# Patient Record
Sex: FEMALE | Race: BLACK OR AFRICAN AMERICAN | Employment: FULL TIME | URBAN - METROPOLITAN AREA
[De-identification: names, ages, dates, MRNs, and addresses within clinical notes are randomized per-mention and may not be internally consistent; named-entity substitution may affect disease eponyms.]

---

## 2017-11-08 ENCOUNTER — HOSPITAL ENCOUNTER (OUTPATIENT)
Dept: FAMILY PLANNING/WOMEN'S HEALTH CLINIC | Age: 42
Discharge: HOME | End: 2017-11-08
Payer: COMMERCIAL

## 2017-11-08 PROCEDURE — 99203 OFFICE O/P NEW LOW 30 MIN: CPT

## 2017-11-08 NOTE — PROGRESS NOTES
[address]  [address]  [address] [place] [address], [address] [facility] [address]  Dept: [phone]    LACTATION REPORT TO HEALTHCARE PROVIDER    Date: 2017    Dear Bucky Dietrich MD: Fanny Johns: This is to inform you that I have seen    Baby name (First Name, Last Name): Jonetta Blizzard                                  Mother: Ether Coop    Reason for Visit: insufficient weight gain    Baby date of birth: [birth year]     Bucky Dietrich Gestational age: 44  Birth Weight (Born Outside of [facility]): 3.118 kg (6 lb 14 oz)     Discharge weight: 2.722 kg (6 lb)     Date                  Weight        Recorded Weight Date 1: 10/27/17  Recorded Weight 1: 3.033 kg (6 lb 11 oz)        Recorded  Weight Date 2: 17  Recorded Weight 2: 3.277 kg (7 lb 3.6 oz) (naked)    Pre-feed Weight: 3.297 kg (7 lb 4.3 oz)     Post-feed Weight Right Breast: 3.314 kg (7 lb 4.9 oz)     Total amount of milk transferred: Total Weight Gain: 0.6 oz    Total time of feedin    Amount of milk pumped (PC): 10 ml    Amount of milk/formula supplemented: 60ML formula    Breast Assessment:  Left Breast: Large  Left Nipple: Intact; Everted  Right Breast: Large  Right Nipple: Intact; Everted    Oral assessement: short frenulum    Enclosed please find a copy of the instructions given to the patient. Mom in to visit today for  Consultation regarding baby Tom's feedings and weight gain. Infant is feeding every 1.5-2 hours for approximately 45 minutes per feeding. Mom is giving infant 2 ounces of formula 3 times per day per pediatricians recommendation. Jennifer Denny observed at breast, deep latch obtained, sucking rhythmically with audible swallowing. Infant noted to have a short posterior frenulum. Able to extend tongue over gumline, but has limited elevation of tongue, cupping noted. Information provided to mom for referral to Estefany Edwards for evaluation of a tight frenulum.     After nursing today during consultation, infant transferred approximately 0.6 ounces of breast milk. Following nursing, mom double pumped using the Spectra pump and obtained 10cc of breastmilk. Infant took 2 ounces of formula following nursing session. Please feel free to contact me at [facility] with any questions or concerns.     Thank you,    Chad Padgett RN [facility]

## 2017-11-08 NOTE — DISCHARGE INSTRUCTIONS
Baby's Name:   Weight / Amount Date/Location     Birth Weight 6 lb 14 oz 10/15/17   [facility]    Discharge Weight 6 lb 0 oz     Weight Check      Last Weight 6 lb 11 oz 10/27/17 Pediatrician    Today's Weight - Pre-feed 7 lb 4.3 oz 11/8/17 [facility]    Today's Weight - Post-feed 7 lb 4.9 oz     Total Amount of Milk Transferred   . 6 ounce          Notes:           Next weight check at [address]  Date Location   Return in 1 week  [place]       Breastfeeding instructions    · Follow up: Following ENT consult                                                               · Eat at least 3 well-balanced meals per day with snacks and drink to thirst.  · Sleep when baby sleeps  · Watch for babys natural feeding cues: e.g. sucking on fist, rooting. Dont allow baby to become too hungry. Skin to skin contact with baby as much as possible during day. · Attempt to feed baby every 2-3 hours for minimum of 8 times per day, maximum 12 times per day. Special Instructions:                              Pump for 10-15 minutes after breastfeeding sessions         · Nurse baby on first breast until he/she no longer swallows, even with breast compression, then offer second breast.    · Feed baby     2   ounce(s) of expressed breast milk / formula of pediatricians choice after breastfeeding(s). ·  If baby doesn't breast feed give        oz at each feeding. · Use wide based nipple/slow flow nipple such as Elisha/TommyTippee/Munchkin Latch/Calma. View video of paced bottle feeding at www.Modular Patterns. InvestGlass  Or  http://Mavent. InvestGlass/paced-bottle-feeding/  · Keep written record of feeding times and babys urine and stool output. · Call pediatrician if baby:  1. Has less than 6 wet / 3-4 stools in 24 hours (newborns over 6 days)  2. Has concentrated, strong smelling urine  3. Is lethargic or not feeding  4. Has forceful spitting  5. Is extremely fussy or colicky and cannot be calmed  6.  Is jaundiced / color is yellow  7. Is running a fever  8. Blood or mucous in the stool  9. Any concerns    INFORMATION ON TONGUE TIE:    Tongue-tie is an anatomical abnormality affecting the ability of the tongue to move in an appropriate way to facilitate proper suckling at the breast. This may affect either the ability to stick the tongue out or to lift the tongue upward or both. Tongue-tie can sometimes be seen when the baby raises the tongue and sometimes can on ly be felt by an experienced medical professional on exam.    [place] April can contribute to nipple pain and damage, ineffective breast emptying, tiring easily at the breast, short sleep cycles, reflux and colic, lip blisters, and poor weight gain. In a majority of cases,  tongue tie is accompanied by lip tie as well which prevents the upper lip from flanging out properly. Almost all tongue-tie and lip-tie can be treated with a simple medical procedure to release the tie. This procedure should be performed by an experienced medical professional. Langley Snellen refers clients suspected of tongue or lip tie to Dr. Gus Abdi at [address] [place] [address], and [facility] located at [address] [place] [address], [address] [place] [address]. [phone]    For additional information on tongue and lip tie and post procedure exercises go to [facility]. Schedule consult with Dr. Tiffany Aquino and return to [address] [place] [address] following ENT consult for evaluation. Start herbal supplements as suggested on handouts to boost milk supply.

## 2017-11-10 ENCOUNTER — HOSPITAL ENCOUNTER (OUTPATIENT)
Dept: FAMILY PLANNING/WOMEN'S HEALTH CLINIC | Age: 42
Discharge: HOME | End: 2017-11-10
Payer: COMMERCIAL

## 2017-11-10 PROCEDURE — 99213 OFFICE O/P EST LOW 20 MIN: CPT

## 2017-11-10 NOTE — DISCHARGE INSTRUCTIONS
Baby's Name: Mother's Name:   Kendell Stallings   Baby's Date of Birth: Date of Procedure:    10/[birth year]/17  11/10/17   Procedure(s) Done: Physician:   linguinal frenotomy; labial frenulectomy  Courtney Alvarado     Notes: Mother reports that baby seemed to latch and tug better yesterday after procedure, but this morning not as strong. Feeding now with wide gape, swallows noted. Reviewed how to position infant with nipple angled toward roof of mouth and lower lip low on areola. Intake 0.9 oz. In 30 minutes Compared with 0.6 ounces in 35 minutes. Encouraged pumping after feedings for increasing supply, and to start More Milk Plus herbal supplement when able. Will schedule follow up visit for pre and post feed weight check after 7 days of taking herbal supplement. Pre-feed Weight: 7 lb 6.1 oz Post-feed Weight: 7 lb 7 oz       Length of Feeding (minutes): 30 minutes Total Intake: 0.9 oz       Next Follow-up:  Location of Follow-up:    to be scheduled in 7 to 10 days  AWP                         . awp

## 2017-11-20 ENCOUNTER — HOSPITAL ENCOUNTER (OUTPATIENT)
Dept: FAMILY PLANNING/WOMEN'S HEALTH CLINIC | Age: 42
Discharge: HOME | End: 2017-11-20
Payer: COMMERCIAL

## 2017-11-20 PROCEDURE — 99211 OFF/OP EST MAY X REQ PHY/QHP: CPT

## 2017-11-20 PROCEDURE — 99212 OFFICE O/P EST SF 10 MIN: CPT

## 2018-07-12 ENCOUNTER — OFFICE VISIT (OUTPATIENT)
Dept: INTERNAL MEDICINE CLINIC | Age: 43
End: 2018-07-12

## 2018-07-12 VITALS
SYSTOLIC BLOOD PRESSURE: 118 MMHG | DIASTOLIC BLOOD PRESSURE: 66 MMHG | WEIGHT: 177 LBS | HEIGHT: 64 IN | RESPIRATION RATE: 12 BRPM | HEART RATE: 83 BPM | TEMPERATURE: 98.4 F | BODY MASS INDEX: 30.22 KG/M2 | OXYGEN SATURATION: 98 %

## 2018-07-12 DIAGNOSIS — J06.9 VIRAL UPPER RESPIRATORY TRACT INFECTION: Primary | ICD-10-CM

## 2018-07-12 DIAGNOSIS — L30.9 HAND DERMATITIS: ICD-10-CM

## 2018-07-12 NOTE — PROGRESS NOTES
HISTORY OF PRESENT ILLNESS  Eve Donis is a 41 y.o. female. HPI  New patient to me, previous patient of R and [facility]. 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 No recurrence. Follows up yearly with ENT oncologist and radiation oncologist.  Missed this years appointment but will schedule. Had 1st child (son) born October 2017. Required C section secondary to fever and tachycardia of baby but otw nml  Pregnancy. IN the last month has started to exercise again and has lost 10 lbs. Prepregnancy weight was 150 but would like to get back to 140s. Runs for exercise. 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 Last week son diagnosed with ear infection. Went back yesterday and had hand foot and mouth. Pt had rash start dorsal aspect of left hand. Also with nasal congestion, rhinorrhea, headache, neck soreness. Low grade temp on Monday. Current Outpatient Prescriptions:     ketotifen (ZADITOR) 0.025 % ophthalmic solution, Administer 1 Drop to both eyes two (2) times a day., Disp: , Rfl:     ACETAMINOPHEN (TYLENOL 8 HOUR PO), Take  by mouth., Disp: , Rfl:     multivitamin (ONE A DAY) tablet, Take 1 Tab by mouth daily. , Disp: , Rfl:     CHOLECALCIFEROL, VITAMIN D3, (VITAMIN D3 PO), Take  by mouth., Disp: , Rfl:     IBUPROFEN (ADVIL PO), Take  by mouth as needed. , Disp: , Rfl:     HYLATOPIC PLUS crea, , Disp: , Rfl: 2    desogestrel-ethinyl estradiol (DESOGEN) 0.15-0.03 mg per tablet, Take 1 Tab by mouth daily. , Disp: 1 Package, Rfl: 11    OTHER, Cepia complex in alcohol, Disp: 30 tablet, Rfl: 0    Visit Vitals    /66 (BP 1 Location: Right arm, BP Patient Position: Sitting)    Pulse 83    Temp 98.4 °F (36.9 °C) (Oral)    Resp 12    Ht 5' 4\" (1.626 m)    Wt 177 lb (80.3 kg)    SpO2 98%    BMI 30.38 kg/m2           ROS  See above  Physical Exam   Constitutional: She appears well-developed and well-nourished. HENT:   Head: Normocephalic and atraumatic. Right Ear: External ear normal.   Left Ear: External ear normal.   Nares - edematous with clear discharge  OP - mild posterior drainage and erythema but no ulcerations or lesions. Sinuses nontender. Neck: Neck supple. Cardiovascular: Normal rate, regular rhythm and normal heart sounds. Exam reveals no gallop and no friction rub. No murmur heard. Pulmonary/Chest: Effort normal and breath sounds normal.   Lymphadenopathy:     She has no cervical adenopathy. Skin:   Left dorsal hand with dryness and scaling. No erythema  Palmar hands and feet without lesions or ulcerations. Vitals reviewed. ASSESSMENT and PLAN  URI - mucinex and saline ns,  Discussed signs and symptoms of hand, foot and mouth dz but no symptoms at this time  Dermatitis left dorsal hand - otc hydrocortisone cream.    Hx mucoepidermoid carcinoma of the hard palate - f/u with ENT oncologist and radiation oncologist  No orders of the defined types were placed in this encounter.     Follow-up Disposition: Not on File

## 2018-07-12 NOTE — PATIENT INSTRUCTIONS
Mucinex (plain not D or DM) 1200mg twice a day  Saline nasal spray - 2 squirts each nostril 2-3 times daily.

## 2018-07-12 NOTE — PROGRESS NOTES
1. Have you been to the ER, urgent care clinic since your last visit? Hospitalized since your last visit? Yes When: 10/2017 Where: [address] doctors Reason for visit: Birth      2. Have you seen or consulted any other health care providers outside of the [address] [place] [address] since your last visit? Include any pap smears or colon screening. No    Chief Complaint   Patient presents with    Establish Care    Rash     Pt states on left hand x1 day. Pt states skin feels dry.  Sinus Infection     Pt states headache, nasal congestion, neck pain x4 days. Pt states treatment with plenty of fluids, tyelnol with some relief.

## 2018-07-12 NOTE — MR AVS SNAPSHOT
[address] [place] [address] [address] [facility] 
[phone] Patient: Alley Shields MRN: [id number] Visit Information Date & Time Provider Department Dept. Phone Encounter #  
 7/12/2018 10:40 AM Enrike Smith MD [facility] [phone] [id number] Upcoming Health Maintenance Date Due Pneumococcal 19-64 Highest Risk (1 of 3 - PCV13) 2/25/1996 PAP AKA CERVICAL CYTOLOGY 12/9/2017 Influenza Age 5 to Adult 8/1/2018 DTaP/Tdap/Td series (2 - Td) 9/22/2025 Allergies as of 7/12/2018  Review Complete On: 7/12/2018 By: Enrike Smith MD  
  
 Severity Noted Reaction Type Reactions Advil [Ibuprofen]  12/09/2014    Rash Drug induced eruption Codeine  11/08/2011    Unknown (comments) Sulfa (Sulfonamide Antibiotics)  11/08/2011    Unknown (comments) Vomiting Current Immunizations  Reviewed on 9/22/2015 Name Date Influenza Vaccine 9/17/2016, 9/27/2014, 10/21/2013 Tdap 9/22/2015 Not reviewed this visit Vitals BP Pulse Temp Resp Height(growth percentile) Weight(growth percentile)  
 118/66 (BP 1 Location: Right arm, BP Patient Position: Sitting) 83 98.4 °F (36.9 °C) (Oral) 12 5' 4\" (1.626 m) 177 lb (80.3 kg) SpO2 BMI OB Status Smoking Status 98% 30.38 kg/m2 Recent pregnancy Never Smoker BMI and BSA Data Body Mass Index Body Surface Area  
 30.38 kg/m 2 1.9 m 2 Preferred Pharmacy Pharmacy Name Phone [facility] [address]  [facility] [phone] Your Updated Medication List  
  
   
This list is accurate as of 7/12/18 11:33 AM.  Always use your most recent med list. ADVIL PO Take  by mouth as needed. multivitamin tablet Commonly known as:  ONE A DAY Take 1 Tab by mouth daily.   
  
 TYLENOL 8 HOUR PO  
 Take  by mouth. VITAMIN D3 PO Take  by mouth. ZADITOR 0.025 % (0.035 %) ophthalmic solution Generic drug:  ketotifen Administer 1 Drop to both eyes two (2) times a day. Patient Instructions Mucinex (plain not D or DM) 1200mg twice a day Saline nasal spray - 2 squirts each nostril 2-3 times daily. Introducing [facility] SERVICES! Dear Silvano Smoker: 
Thank you for requesting a [email or web address] account. Our records indicate that you already have an active [email or web address] account. You can access your account anytime at [email or web address] Did you know that you can access your hospital and ER discharge instructions at any time in [email or web address]? You can also review all of your test results from your hospital stay or ER visit. Additional Information If you have questions, please visit the Frequently Asked Questions section of the [email or web address] website at [email or web address] Remember, [email or web address] is NOT to be used for urgent needs. For medical emergencies, dial 911. Now available from your iPhone and Android! Please provide this summary of care documentation to your next provider. Your primary care clinician is listed as KISHORE [facility]. If you have any questions after today's visit, please call [phone].

## 2019-07-17 ENCOUNTER — OFFICE VISIT (OUTPATIENT)
Dept: INTERNAL MEDICINE CLINIC | Age: 44
End: 2019-07-17

## 2019-07-17 VITALS
HEART RATE: 72 BPM | WEIGHT: 177 LBS | TEMPERATURE: 98 F | BODY MASS INDEX: 30.22 KG/M2 | HEIGHT: 64 IN | DIASTOLIC BLOOD PRESSURE: 66 MMHG | OXYGEN SATURATION: 99 % | SYSTOLIC BLOOD PRESSURE: 116 MMHG

## 2019-07-17 DIAGNOSIS — E66.9 OBESITY (BMI 30.0-34.9): ICD-10-CM

## 2019-07-17 DIAGNOSIS — C05.0 MUCOEPIDERMOID CARCINOMA OF HARD PALATE (HCC): ICD-10-CM

## 2019-07-17 DIAGNOSIS — Z00.00 ROUTINE GENERAL MEDICAL EXAMINATION AT A HEALTH CARE FACILITY: Primary | ICD-10-CM

## 2019-07-17 NOTE — PROGRESS NOTES
Chief Complaint   Patient presents with    Physical     1. Have you been to the ER, urgent care clinic since your last visit? Hospitalized since your last visit? Yes, went to [facility] Sept 2018, 2019.    2. Have you seen or consulted any other health care providers outside of the [address] [place] [address] since your last visit? Include any pap smears or colon screening.  No    Visit Vitals  /66   Pulse 72   Temp 98 °F (36.7 °C) (Oral)   Ht 5' 4\" (1.626 m)   Wt 177 lb (80.3 kg)   SpO2 99%   BMI 30.38 kg/m²

## 2019-07-17 NOTE — PROGRESS NOTES
Subjective:   45 y.o. female for Well Woman Check. Her gyne and breast care is done elsewhere by her Ob-Gyne physician. Has an ob/gyn appointment at the end of the year. Patient Active Problem List    Diagnosis Date Noted    [facility]ermoid [facility] hard [facility] 12/10/2012    Chest pain, unspecified 11/14/2011    Allergic rhinitis 11/08/2011    Borderline blood pressure 11/08/2011     Current Outpatient Medications   Medication Sig Dispense Refill    ketotifen (ZADITOR) 0.025 % ophthalmic solution Administer 1 Drop to both eyes two (2) times a day.  ACETAMINOPHEN (TYLENOL 8 HOUR PO) Take  by mouth. As needed      multivitamin (ONE A DAY) tablet Take 1 Tab by mouth daily.  CHOLECALCIFEROL, VITAMIN D3, (VITAMIN D3 PO) Take 2,000 Units by mouth. Once daily      IBUPROFEN (ADVIL PO) Take  by mouth as needed. Allergies   Allergen Reactions    Advil [Ibuprofen] Rash     Drug induced eruption    Codeine Unknown (comments)    Sulfa (Sulfonamide Antibiotics) Unknown (comments)     Vomiting     Past Medical History:   Diagnosis Date    Adjustment disorder     Cancer ([facility]     mucoepidermoid carcinoma left hard palate    Dysmenorrhea     Essential hypertension     Borderline    Positive PPD, treated     INH (x9 months)     Past Surgical History:   Procedure Laterality Date    HX GYN  1996    cryotherapy (abnl. Pap)    HX HEENT       Family History   Problem Relation Age of Onset    Hypertension Mother     High Cholesterol Mother     Sudden Death Father 46        Drug abuse    Hypertension Father     Cancer Maternal Grandmother      Social History     Tobacco Use    Smoking status: Never Smoker    Smokeless tobacco: Never Used   Substance Use Topics    Alcohol use: Yes     Alcohol/week: 0.8 standard drinks     Types: 1 Glasses of wine per week             Specific concerns today:   Working out more but weight has not changed though her shape has improved.   Feels she is doing well with her diet - almost wonders if she is not eating enough. Does drink 1 soda per day. .  Every other month can feel more irritable prior to menses. Started magnesium in the last couple of weeks. More pain in legs and calfs with increased exercise. At night legs feel more tingling approx 2 months ago. Saw her oncology/ent in May for follow up of  left hard palate mucoepidermoid carcinoma. Review of Systems  Constitutional: negative  Eyes: negative except for contacts/glasses  Ears, nose, mouth, throat, and face: negative  Respiratory: negative  Cardiovascular: negative  Gastrointestinal: negative  Genitourinary:negative  Integument/breast: negative  Hematologic/lymphatic: negative  Musculoskeletal:had left dequervains tenosynovitis sheath release earlier this year, 1/2019 , right mild dequervains tenosynovitis  Neurological: negative  Behavioral/Psych: negative  Endocrine: negative  Allergic/Immunologic: negative    Objective:   Blood pressure 116/66, pulse 72, temperature 98 °F (36.7 °C), temperature source Oral, height 5' 4\" (1.626 m), weight 177 lb (80.3 kg), SpO2 99 %, unknown if currently breastfeeding.    Physical Examination:   General appearance - alert, well appearing, and in no distress and oriented to person, place, and time  Mental status - alert, oriented to person, place, and time  Eyes - pupils equal and reactive, extraocular eye movements intact  Ears - bilateral TM's and external ear canals normal  Nose - normal and patent, no erythema, discharge or polyps  Mouth - mucous membranes moist, pharynx normal without lesions  Neck - supple, no significant adenopathy, carotids upstroke normal bilaterally, no bruits, thyroid exam: thyroid is normal in size without nodules or tenderness  Lymphatics - no palpable lymphadenopathy, no hepatosplenomegaly  Chest - clear to auscultation, no wheezes, rales or rhonchi, symmetric air entry  Heart - normal rate, regular rhythm, normal S1, S2, no murmurs, rubs, clicks or gallops  Abdomen - soft, nontender, nondistended, no masses or organomegaly  bowel sounds normal  Breasts - breasts appear normal, no suspicious masses, no skin or nipple changes or axillary nodes  Back exam - full range of motion, no tenderness, palpable spasm or pain on motion  Neurological - alert, oriented, normal speech, no focal findings or movement disorder noted  Musculoskeletal - no joint tenderness, deformity or swelling  Extremities - peripheral pulses normal, no pedal edema, no clubbing or cyanosis  Skin - normal coloration and turgor, no rashes, no suspicious skin lesions noted     Assessment/Plan:   39yo female  Obesity - working at exercise for weight loss. Has lost inches but not lbs. Will work of food journaling, cut out sodas. Mucoepidermoid carcinoma - no signs of recurrence.  - screening labs ordered.     call if any problems  Orders Placed This Encounter    METABOLIC PANEL, COMPREHENSIVE    LIPID PANEL    VITAMIN D, 25 HYDROXY    CBC W/O DIFF    TSH 3RD GENERATION

## 2019-07-18 ENCOUNTER — TELEPHONE (OUTPATIENT)
Dept: INTERNAL MEDICINE CLINIC | Age: 44
End: 2019-07-18

## 2019-07-18 DIAGNOSIS — E55.9 VITAMIN D DEFICIENCY: Primary | ICD-10-CM

## 2019-07-18 LAB
25(OH)D3+25(OH)D2 SERPL-MCNC: 17.5 NG/ML (ref 30–100)
ALBUMIN SERPL-MCNC: 4.4 G/DL (ref 3.5–5.5)
ALBUMIN/GLOB SERPL: 1.6 {RATIO} (ref 1.2–2.2)
ALP SERPL-CCNC: 60 IU/L (ref 39–117)
ALT SERPL-CCNC: 19 IU/L (ref 0–32)
AST SERPL-CCNC: 22 IU/L (ref 0–40)
BILIRUB SERPL-MCNC: 0.5 MG/DL (ref 0–1.2)
BUN SERPL-MCNC: 11 MG/DL (ref 6–24)
BUN/CREAT SERPL: 12 (ref 9–23)
CALCIUM SERPL-MCNC: 9.1 MG/DL (ref 8.7–10.2)
CHLORIDE SERPL-SCNC: 102 MMOL/L (ref 96–106)
CHOLEST SERPL-MCNC: 114 MG/DL (ref 100–199)
CO2 SERPL-SCNC: 24 MMOL/L (ref 20–29)
CREAT SERPL-MCNC: 0.91 MG/DL (ref 0.57–1)
ERYTHROCYTE [DISTWIDTH] IN BLOOD BY AUTOMATED COUNT: 11.8 % (ref 12.3–15.4)
GLOBULIN SER CALC-MCNC: 2.7 G/DL (ref 1.5–4.5)
GLUCOSE SERPL-MCNC: 78 MG/DL (ref 65–99)
HCT VFR BLD AUTO: 38.1 % (ref 34–46.6)
HDLC SERPL-MCNC: 47 MG/DL
HGB BLD-MCNC: 12.1 G/DL (ref 11.1–15.9)
INTERPRETATION, 910389: NORMAL
LDLC SERPL CALC-MCNC: 58 MG/DL (ref 0–99)
MCH RBC QN AUTO: 27.5 PG (ref 26.6–33)
MCHC RBC AUTO-ENTMCNC: 31.8 G/DL (ref 31.5–35.7)
MCV RBC AUTO: 87 FL (ref 79–97)
PLATELET # BLD AUTO: 202 X10E3/UL (ref 150–450)
POTASSIUM SERPL-SCNC: 4.4 MMOL/L (ref 3.5–5.2)
PROT SERPL-MCNC: 7.1 G/DL (ref 6–8.5)
RBC # BLD AUTO: 4.4 X10E6/UL (ref 3.77–5.28)
SODIUM SERPL-SCNC: 139 MMOL/L (ref 134–144)
TRIGL SERPL-MCNC: 45 MG/DL (ref 0–149)
TSH SERPL DL<=0.005 MIU/L-ACNC: 1.31 UIU/ML (ref 0.45–4.5)
VLDLC SERPL CALC-MCNC: 9 MG/DL (ref 5–40)
WBC # BLD AUTO: 3.9 X10E3/UL (ref 3.4–10.8)

## 2019-07-18 RX ORDER — ERGOCALCIFEROL 1.25 MG/1
50000 CAPSULE ORAL 2 TIMES WEEKLY
Qty: 8 CAP | Refills: 2 | Status: SHIPPED | OUTPATIENT
Start: 2019-07-19 | End: 2020-07-21 | Stop reason: ALTCHOICE

## 2019-07-18 NOTE — TELEPHONE ENCOUNTER
Vit d very low  Start ergocalciferol and repeat labs in 3 months.   Pt notified via [facility]

## 2019-10-08 LAB — 25(OH)D3+25(OH)D2 SERPL-MCNC: 37.9 NG/ML (ref 30–100)

## 2019-10-18 DIAGNOSIS — E55.9 VITAMIN D DEFICIENCY: ICD-10-CM

## 2020-07-21 ENCOUNTER — OFFICE VISIT (OUTPATIENT)
Dept: INTERNAL MEDICINE CLINIC | Age: 45
End: 2020-07-21

## 2020-07-21 ENCOUNTER — HOSPITAL ENCOUNTER (OUTPATIENT)
Dept: LAB | Age: 45
Discharge: HOME | End: 2020-07-21

## 2020-07-21 VITALS
HEIGHT: 64 IN | HEART RATE: 78 BPM | TEMPERATURE: 96.9 F | WEIGHT: 170 LBS | SYSTOLIC BLOOD PRESSURE: 120 MMHG | BODY MASS INDEX: 29.02 KG/M2 | DIASTOLIC BLOOD PRESSURE: 76 MMHG

## 2020-07-21 DIAGNOSIS — E55.9 VITAMIN D DEFICIENCY: ICD-10-CM

## 2020-07-21 DIAGNOSIS — Z00.00 ROUTINE GENERAL MEDICAL EXAMINATION AT A HEALTH CARE FACILITY: ICD-10-CM

## 2020-07-21 DIAGNOSIS — Z00.00 ROUTINE GENERAL MEDICAL EXAMINATION AT A HEALTH CARE FACILITY: Primary | ICD-10-CM

## 2020-07-21 LAB
25(OH)D3 SERPL-MCNC: 23.9 NG/ML (ref 30–100)
ALBUMIN SERPL-MCNC: 4.2 G/DL (ref 3.5–5)
ALBUMIN/GLOB SERPL: 1.2 {RATIO} (ref 1.1–2.2)
ALP SERPL-CCNC: 61 U/L (ref 45–117)
ALT SERPL-CCNC: 31 U/L (ref 12–78)
ANION GAP SERPL CALC-SCNC: 8 MMOL/L (ref 5–15)
AST SERPL-CCNC: 19 U/L (ref 15–37)
BILIRUB SERPL-MCNC: 0.6 MG/DL (ref 0.2–1)
BUN SERPL-MCNC: 8 MG/DL (ref 6–20)
BUN/CREAT SERPL: 10 (ref 12–20)
CALCIUM SERPL-MCNC: 8.9 MG/DL (ref 8.5–10.1)
CHLORIDE SERPL-SCNC: 108 MMOL/L (ref 97–108)
CHOLEST SERPL-MCNC: 144 MG/DL
CO2 SERPL-SCNC: 24 MMOL/L (ref 21–32)
CREAT SERPL-MCNC: 0.79 MG/DL (ref 0.55–1.02)
ERYTHROCYTE [DISTWIDTH] IN BLOOD BY AUTOMATED COUNT: 12.3 % (ref 11.5–14.5)
GLOBULIN SER CALC-MCNC: 3.6 G/DL (ref 2–4)
GLUCOSE SERPL-MCNC: 95 MG/DL (ref 65–100)
HCT VFR BLD AUTO: 38.3 % (ref 35–47)
HDLC SERPL-MCNC: 47 MG/DL
HDLC SERPL: 3.1 {RATIO} (ref 0–5)
HGB BLD-MCNC: 11.7 G/DL (ref 11.5–16)
LDLC SERPL CALC-MCNC: 86.2 MG/DL (ref 0–100)
LIPID PROFILE,FLP: NORMAL
MCH RBC QN AUTO: 27.3 PG (ref 26–34)
MCHC RBC AUTO-ENTMCNC: 30.5 G/DL (ref 30–36.5)
MCV RBC AUTO: 89.5 FL (ref 80–99)
NRBC # BLD: 0 K/UL (ref 0–0.01)
NRBC BLD-RTO: 0 PER 100 WBC
PLATELET # BLD AUTO: 168 K/UL (ref 150–400)
PMV BLD AUTO: 11.9 FL (ref 8.9–12.9)
POTASSIUM SERPL-SCNC: 4.2 MMOL/L (ref 3.5–5.1)
PROT SERPL-MCNC: 7.8 G/DL (ref 6.4–8.2)
RBC # BLD AUTO: 4.28 M/UL (ref 3.8–5.2)
SODIUM SERPL-SCNC: 140 MMOL/L (ref 136–145)
TRIGL SERPL-MCNC: 54 MG/DL (ref ?–150)
TSH SERPL DL<=0.05 MIU/L-ACNC: 1.2 UIU/ML (ref 0.36–3.74)
VLDLC SERPL CALC-MCNC: 10.8 MG/DL
WBC # BLD AUTO: 2.9 K/UL (ref 3.6–11)

## 2020-07-21 RX ORDER — FEXOFENADINE HCL AND PSEUDOEPHEDRINE HCI 60; 120 MG/1; MG/1
1 TABLET, EXTENDED RELEASE ORAL
COMMUNITY

## 2020-07-21 NOTE — PROGRESS NOTES
Subjective:   39 y.o. female for Well Woman Check. Her gyne and breast care is done elsewhere by her Ob-Gyne physician. Son is 35 years old. Current Outpatient Medications   Medication Sig Dispense Refill    fexofenadine-pseudoephedrine (Allegra-D 12 Hour)  mg Tb12 Take 1 Tab by mouth every twelve (12) hours.  ketotifen (ZADITOR) 0.025 % ophthalmic solution Administer 1 Drop to both eyes two (2) times a day.  ACETAMINOPHEN (TYLENOL 8 HOUR PO) Take  by mouth. As needed      multivitamin (ONE A DAY) tablet Take 1 Tab by mouth daily.  CHOLECALCIFEROL, VITAMIN D3, (VITAMIN D3 PO) Take 2,000 Units by mouth. Once daily      IBUPROFEN (ADVIL PO) Take  by mouth as needed. Lab Results   Component Value Date/Time    Glucose 78 07/17/2019 10:11 AM    LDL, calculated 58 07/17/2019 10:11 AM    Creatinine 0.91 07/17/2019 10:11 AM      Lab Results   Component Value Date/Time    Cholesterol, total 114 07/17/2019 10:11 AM    HDL Cholesterol 47 07/17/2019 10:11 AM    LDL, calculated 58 07/17/2019 10:11 AM    Triglyceride 45 07/17/2019 10:11 AM     Lab Results   Component Value Date/Time    ALT (SGPT) 19 07/17/2019 10:11 AM    Alk. phosphatase 60 07/17/2019 10:11 AM    Bilirubin, direct 0.1 09/01/2009 05:26 PM    Bilirubin, total 0.5 07/17/2019 10:11 AM    Albumin 4.4 07/17/2019 10:11 AM    Protein, total 7.1 07/17/2019 10:11 AM    PLATELET 456 92/55/2415 10:11 AM     Lab Results   Component Value Date/Time    GFR est non-AA 78 07/17/2019 10:11 AM    GFR est AA 90 07/17/2019 10:11 AM    Creatinine 0.91 07/17/2019 10:11 AM    BUN 11 07/17/2019 10:11 AM    Sodium 139 07/17/2019 10:11 AM    Potassium 4.4 07/17/2019 10:11 AM    Chloride 102 07/17/2019 10:11 AM    CO2 24 07/17/2019 10:11 AM     Lab Results   Component Value Date/Time    TSH 1.310 07/17/2019 10:11 AM         Specific concerns today: none.     Review of Systems  Constitutional: negative  Eyes: negative  Ears, nose, mouth, throat, and face: negative  Respiratory: negative  Cardiovascular: negative  Gastrointestinal: negative  Genitourinary:negative  Integument/breast: negative  Hematologic/lymphatic: negative  Musculoskeletal:negative  Neurological: negative  Behavioral/Psych: negative  Endocrine: negative  Allergic/Immunologic: negative except for seasonal alleriges    Objective:   Temperature 96.9 °F (36.1 °C), temperature source Oral, unknown if currently breastfeeding.    Physical Examination:   General appearance - alert, well appearing, and in no distress and oriented to person, place, and time  Mental status - alert, oriented to person, place, and time, normal mood, behavior, speech, dress, motor activity, and thought processes  Eyes - pupils equal and reactive, extraocular eye movements intact  Ears - bilateral TM's and external ear canals normal  Nose - normal and patent, no erythema, discharge or polyps  Mouth - mucous membranes moist, pharynx normal without lesions  Neck - supple, no significant adenopathy, carotids upstroke normal bilaterally, no bruits, thyroid exam: thyroid is normal in size without nodules or tenderness  Lymphatics - no palpable lymphadenopathy, no hepatosplenomegaly  Chest - clear to auscultation, no wheezes, rales or rhonchi, symmetric air entry  Heart - normal rate, regular rhythm, normal S1, S2, no murmurs, rubs, clicks or gallops  Abdomen - soft, nontender, nondistended, no masses or organomegaly  bowel sounds normal  Breasts - breasts appear normal, no suspicious masses, no skin or nipple changes or axillary nodes  Back exam - full range of motion, no tenderness, palpable spasm or pain on motion  Neurological - alert, oriented, normal speech, no focal findings or movement disorder noted  Musculoskeletal - no joint tenderness, deformity or swelling  Extremities - peripheral pulses normal, no pedal edema, no clubbing or cyanosis  Skin - normal coloration and turgor, no rashes, no suspicious skin lesions noted Assessment/Plan:   48yo female here for PE  Allergic rhinitis - controlled,   HM _ labs ordered.   UTD with gyn.    call if any problems  Orders Placed This Encounter    METABOLIC PANEL, COMPREHENSIVE    LIPID PANEL    CBC W/O DIFF    TSH 3RD GENERATION    VITAMIN D, 25 HYDROXY    fexofenadine-pseudoephedrine (Allegra-D 12 Hour)  mg Tb12

## 2020-07-21 NOTE — PROGRESS NOTES
1. Have you been to the ER, urgent care clinic since your last visit? Hospitalized since your last visit? No    2. Have you seen or consulted any other health care providers outside of the [address] [place] [address] since your last visit? Include any pap smears or colon screening. No   Chief Complaint   Patient presents with    Complete Physical     Patient has been informed of any other discussion outside the parameters of the physical could result in other charges including a co pay, patient verbalized understanding.

## 2020-07-22 ENCOUNTER — TELEPHONE (OUTPATIENT)
Dept: INTERNAL MEDICINE CLINIC | Age: 45
End: 2020-07-22

## 2020-07-22 DIAGNOSIS — D72.819 LEUKOPENIA, UNSPECIFIED TYPE: Primary | ICD-10-CM

## 2020-07-22 DIAGNOSIS — E78.9 BORDERLINE HIGH CHOLESTEROL: ICD-10-CM

## 2020-07-22 DIAGNOSIS — E55.9 VITAMIN D DEFICIENCY: ICD-10-CM

## 2020-07-22 NOTE — TELEPHONE ENCOUNTER
----- Message from Yovani Oliver LPN sent at 0/59/3136 12:18 PM EDT -----  Regarding: FW: Test Results Question  Contact: [phone]    ----- Message -----  From: Jo Covington  Sent: 7/22/2020  10:03 AM EDT  To: Karlos Burden Pool  Subject: Test Results Question                            Dr. Dev Kraft,     May I also repeat the vitamin D test on October/November?      Thank you,  Suresh Page, PhD, MT([facility])

## 2020-07-22 NOTE — TELEPHONE ENCOUNTER
----- Message from Leona Dias LPN sent at 1/60/5746  9:34 AM EDT -----  Regarding: FW: Test Results Question  Contact: [phone]    ----- Message -----  From: Suman Cesar  Sent: 7/22/2020   9:25 AM EDT  To: Shahrzad Dry Nurse Pool  Subject: Test Results Question                            Hi Dr. Mateo Arteaga,     I'm not too concerned with my WBC count either because -Americans can have normal WBC counts as low as 3.0 x 10^9/L (please refer to the attachment). Although my lipid panel results are normal, I'm personally concerned that they are worse than last year (i.e., when I was exercising). Thus, I plan to make a lifestyle change. May I repeat both the CBC and lipid panel in October/November 2020? Hopefully this approach tawanda allow the lifestyle change to take affect. Thank you and take care.     Best regards,   Alexander yLons, PhD, MT([facility])

## 2020-07-22 NOTE — TELEPHONE ENCOUNTER
Please mail her the lab slip with the lipid panel, CBC and vit D orders. Shred the other 2 labs printed today.

## 2020-11-04 LAB
25(OH)D3+25(OH)D2 SERPL-MCNC: 56.9 NG/ML (ref 30–100)
BASOPHILS # BLD AUTO: 0 X10E3/UL (ref 0–0.2)
BASOPHILS NFR BLD AUTO: 1 %
CHOLEST SERPL-MCNC: 135 MG/DL (ref 100–199)
EOSINOPHIL # BLD AUTO: 0 X10E3/UL (ref 0–0.4)
EOSINOPHIL NFR BLD AUTO: 1 %
ERYTHROCYTE [DISTWIDTH] IN BLOOD BY AUTOMATED COUNT: 12.2 % (ref 11.7–15.4)
HCT VFR BLD AUTO: 37.8 % (ref 34–46.6)
HDLC SERPL-MCNC: 50 MG/DL
HGB BLD-MCNC: 12.3 G/DL (ref 11.1–15.9)
IMM GRANULOCYTES # BLD AUTO: 0 X10E3/UL (ref 0–0.1)
IMM GRANULOCYTES NFR BLD AUTO: 0 %
INTERPRETATION, 910389: NORMAL
LDLC SERPL CALC-MCNC: 74 MG/DL (ref 0–99)
LYMPHOCYTES # BLD AUTO: 1.1 X10E3/UL (ref 0.7–3.1)
LYMPHOCYTES NFR BLD AUTO: 25 %
MCH RBC QN AUTO: 27.7 PG (ref 26.6–33)
MCHC RBC AUTO-ENTMCNC: 32.5 G/DL (ref 31.5–35.7)
MCV RBC AUTO: 85 FL (ref 79–97)
MONOCYTES # BLD AUTO: 0.3 X10E3/UL (ref 0.1–0.9)
MONOCYTES NFR BLD AUTO: 6 %
NEUTROPHILS # BLD AUTO: 3 X10E3/UL (ref 1.4–7)
NEUTROPHILS NFR BLD AUTO: 67 %
PLATELET # BLD AUTO: 217 X10E3/UL (ref 150–450)
RBC # BLD AUTO: 4.44 X10E6/UL (ref 3.77–5.28)
TRIGL SERPL-MCNC: 49 MG/DL (ref 0–149)
VLDLC SERPL CALC-MCNC: 11 MG/DL (ref 5–40)
WBC # BLD AUTO: 4.4 X10E3/UL (ref 3.4–10.8)

## 2021-10-20 NOTE — PROGRESS NOTES
Subjective:   42 y.o. female for Well Woman Check. Her gyne and breast care is done elsewhere by her Ob-Gyn physician. Cancer of hard palate - seeing Urbano at [facility] yearly. Had rash on foot earlier this year. Treated for ring work x2 without improvement. 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 In [address] loss since May. Has lost 17 lbs. More active - gym, running, kickboxing. UTD gyn, Dr. River Mccullough,  - since her 25s, every other to every 3rd periods (at the beginning of menses, day 1-3) will be more tired and can lose her train of thought. Rest of the time she is perfectly fine. Some headaches - band around top of head. palpible cyst in breasts found of SBE. Had mammogram and US. iit has shrank in size. Right breast.      UTD COVID vaccines (phizer)  UTD flu shot. Patient Active Problem List    Diagnosis Date Noted    Mucoepidermoid carcinoma of hard palate ([facility] [address] 12/10/2012    Chest pain, unspecified 11/14/2011    Allergic rhinitis 11/08/2011    Borderline blood pressure 11/08/2011     Current Outpatient Medications   Medication Sig Dispense Refill    fish oil/borage/flax/om3,6,9 1 (OMEGA 3-6-9 PO) Take 2 Capsules by mouth two (2) times a day.  TURMERIC PO Take 1,200 Caplet by mouth daily.  desoximetasone (TOPICORT) 0.25 % topical cream daily as needed.  Sodium Fluoride 5000 Plus 1.1 % crea daily as needed.  ASHWAGANDHA ROOT EXTRACT PO Take 1,200 mg by mouth two (2) times a day.  calcium carbonate (CALCIUM 300 PO) Take 1,000 mg by mouth two (2) times a day.  PSYLLIUM HUSK PO Take 1,000 mg by mouth two (2) times a day.  CHROMIUM PO Take 201 % by mouth daily.  VITAMIN B COMPLEX PO Take 1 Tablet by mouth two (2) times a day.  OTHER Take 600 mg by mouth two (2) times a day.  Bhaviktakade      fexofenadine-pseudoephedrine (Allegra-D 12 Hour)  mg Tb12 Take 1 Tablet by mouth every twelve (12) hours as needed.  ketotifen (ZADITOR) 0.025 % ophthalmic solution Administer 1 Drop to both eyes two (2) times daily as needed.  ACETAMINOPHEN (TYLENOL 8 HOUR PO) Take  by mouth. As needed      multivitamin (ONE A DAY) tablet Take 1 Tab by mouth daily.  CHOLECALCIFEROL, VITAMIN D3, (VITAMIN D3 PO) Take 2,000 Units by mouth. Once daily      IBUPROFEN (ADVIL PO) Take  by mouth as needed. Allergies   Allergen Reactions    Advil [Ibuprofen] Rash     Drug induced eruption    Codeine Unknown (comments)    Sulfa (Sulfonamide Antibiotics) Unknown (comments)     Vomiting     Past Medical History:   Diagnosis Date    Adjustment disorder     Cancer ([facility] [address]     mucoepidermoid carcinoma left hard palate    Dysmenorrhea     Essential hypertension     Borderline    Positive PPD, treated     INH (x9 months)     Past Surgical History:   Procedure Laterality Date    HX GYN  1996    cryotherapy (abnl. Pap)    HX HEENT       Family History   Problem Relation Age of Onset    Hypertension Mother     High Cholesterol Mother     Sudden Death Father 46        Drug abuse    Hypertension Father     Cancer Maternal Grandmother      Social History     Tobacco Use    Smoking status: Never Smoker    Smokeless tobacco: Never Used   Substance Use Topics    Alcohol use:  Yes     Alcohol/week: 0.8 standard drinks     Types: 1 Glasses of wine per week        Lab Results   Component Value Date/Time    WBC 4.4 11/03/2020 10:46 AM    HGB 12.3 11/03/2020 10:46 AM    HCT 37.8 11/03/2020 10:46 AM    PLATELET 697 71/28/4653 10:46 AM    MCV 85 11/03/2020 10:46 AM     Lab Results   Component Value Date/Time    Glucose 95 07/21/2020 11:45 AM    LDL, calculated 74 11/03/2020 10:46 AM    LDL, calculated 86.2 07/21/2020 11:45 AM    Creatinine 0.79 07/21/2020 11:45 AM      Lab Results   Component Value Date/Time    Cholesterol, total 135 11/03/2020 10:46 AM    HDL Cholesterol 50 11/03/2020 10:46 AM LDL, calculated 74 11/03/2020 10:46 AM    LDL, calculated 86.2 07/21/2020 11:45 AM    Triglyceride 49 11/03/2020 10:46 AM    CHOL/HDL Ratio 3.1 07/21/2020 11:45 AM     Lab Results   Component Value Date/Time    ALT (SGPT) 31 07/21/2020 11:45 AM    Alk. phosphatase 61 07/21/2020 11:45 AM    Bilirubin, direct 0.1 09/01/2009 05:26 PM    Bilirubin, total 0.6 07/21/2020 11:45 AM    Albumin 4.2 07/21/2020 11:45 AM    Protein, total 7.8 07/21/2020 11:45 AM    PLATELET 228 94/10/4039 10:46 AM     Lab Results   Component Value Date/Time    GFR est non-AA >60 07/21/2020 11:45 AM    GFR est AA >60 07/21/2020 11:45 AM    Creatinine 0.79 07/21/2020 11:45 AM    BUN 8 07/21/2020 11:45 AM    Sodium 140 07/21/2020 11:45 AM    Potassium 4.2 07/21/2020 11:45 AM    Chloride 108 07/21/2020 11:45 AM    CO2 24 07/21/2020 11:45 AM     Lab Results   Component Value Date/Time    TSH 1.20 07/21/2020 11:45 AM         Specific concerns today: see above. Review of Systems  Constitutional: negative  Eyes: negative  Ears, nose, mouth, throat, and face: negative  Respiratory: negative  Cardiovascular: negative  Gastrointestinal: negative  Genitourinary:negative  Integument/breast: negative  Hematologic/lymphatic: negative  Musculoskeletal:negative  Neurological: negative except for headaches and lost of words with menses - see above  Behavioral/Psych: negative  Endocrine: negative  Allergic/Immunologic: negative    Objective:   Blood pressure 114/67, pulse 75, temperature 98.2 °F (36.8 °C), temperature source Temporal, resp. rate 20, height 5' 4\" (1.626 m), weight 160 lb (72.6 kg), SpO2 100 %, unknown if currently breastfeeding.    Physical Examination:   General appearance - alert, well appearing, and in no distress and oriented to person, place, and time  Mental status - alert, oriented to person, place, and time  Eyes - pupils equal and reactive, extraocular eye movements intact  Ears - bilateral TM's and external ear canals normal  Nose - normal and patent, no erythema, discharge or polyps  Mouth - mucous membranes moist, pharynx normal without lesions  Neck - supple, no significant adenopathy, carotids upstroke normal bilaterally, no bruits, thyroid exam: thyroid is normal in size without nodules or tenderness  Lymphatics - no palpable lymphadenopathy, no hepatosplenomegaly  Chest - clear to auscultation, no wheezes, rales or rhonchi, symmetric air entry  Heart - normal rate, regular rhythm, normal S1, S2, no murmurs, rubs, clicks or gallops  Abdomen - soft, nontender, nondistended, no masses or organomegaly  Breasts - breasts appear normal, no suspicious masses, no skin or nipple changes or axillary nodes  Back exam - full range of motion, no tenderness, palpable spasm or pain on motion  Neurological - alert, oriented, normal speech, no focal findings or movement disorder noted  Musculoskeletal - no joint tenderness, deformity or swelling  Extremities - peripheral pulses normal, no pedal edema, no clubbing or cyanosis  Skin - normal coloration and turgor, no rashes, no suspicious skin lesions noted     Assessment/Plan:   39yo female here for PE  Mucoepidermoid carcinoma of the hard palate - utd with ENT. 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 Suspect \"loss of words\" a migraine varient. Trial Imitrex. Vit d def - repeat levels. Continue supplements. HM - check labs.     call if any problems  Orders Placed This Encounter    METABOLIC PANEL, COMPREHENSIVE    LIPID PANEL    CBC W/O DIFF    TSH 3RD GENERATION    VITAMIN D, 25 HYDROXY    fish oil/borage/flax/om3,6,9 1 (OMEGA 3-6-9 PO)    TURMERIC PO    desoximetasone (TOPICORT) 0.25 % topical cream    Sodium Fluoride 5000 Plus 1.1 % crea    ASHWAGANDHA ROOT EXTRACT PO    calcium carbonate (CALCIUM 300 PO)    PSYLLIUM HUSK PO    CHROMIUM PO    VITAMIN B COMPLEX PO    OTHER  SUMAtriptan (IMITREX) 100 mg tablet

## 2021-10-21 ENCOUNTER — OFFICE VISIT (OUTPATIENT)
Dept: INTERNAL MEDICINE CLINIC | Age: 46
End: 2021-10-21
Payer: COMMERCIAL

## 2021-10-21 VITALS
TEMPERATURE: 98.2 F | DIASTOLIC BLOOD PRESSURE: 67 MMHG | WEIGHT: 160 LBS | RESPIRATION RATE: 20 BRPM | HEART RATE: 75 BPM | SYSTOLIC BLOOD PRESSURE: 114 MMHG | OXYGEN SATURATION: 100 % | HEIGHT: 64 IN | BODY MASS INDEX: 27.31 KG/M2

## 2021-10-21 DIAGNOSIS — Z00.00 ROUTINE GENERAL MEDICAL EXAMINATION AT A HEALTH CARE FACILITY: Primary | ICD-10-CM

## 2021-10-21 DIAGNOSIS — C05.0 MUCOEPIDERMOID CARCINOMA OF HARD PALATE (HCC): ICD-10-CM

## 2021-10-21 DIAGNOSIS — E55.9 VITAMIN D DEFICIENCY: ICD-10-CM

## 2021-10-21 PROCEDURE — 99396 PREV VISIT EST AGE 40-64: CPT | Performed by: INTERNAL MEDICINE

## 2021-10-21 RX ORDER — SODIUM FLUORIDE 5 MG/G
CREAM DENTAL
COMMUNITY
Start: 2021-09-16 | End: 2022-10-27 | Stop reason: ALTCHOICE

## 2021-10-21 RX ORDER — SUMATRIPTAN 100 MG/1
100 TABLET, FILM COATED ORAL
Qty: 9 TABLET | Refills: 2 | Status: SHIPPED | OUTPATIENT
Start: 2021-10-21 | End: 2021-10-21

## 2021-10-21 RX ORDER — DESOXIMETASONE 2.5 MG/G
CREAM TOPICAL
COMMUNITY
Start: 2021-08-03 | End: 2022-10-27 | Stop reason: ALTCHOICE

## 2021-10-21 NOTE — PROGRESS NOTES
1. Have you been to the ER, urgent care clinic since your last visit? Hospitalized since your last visit? Yes, [facility] [address], x2 rash, sinus. 2. Have you seen or consulted any other health care providers outside of the [address] [place] [address] since your last visit? Include any pap smears or colon screening. Nyasia Lundberg, Dermatologist.  Reji Greene, Oncology ENT    Health Maintenance Due   Topic Date Due    Hepatitis C Screening  Never done    COVID-19 Vaccine (1) Never done    Cervical cancer screen  12/09/2019    Flu Vaccine (1) 09/01/2021     Patient had Flu vaccine at [facility].  Patient her today for physical, discuss colonoscopy, memory problem,

## 2021-10-29 LAB
25(OH)D3+25(OH)D2 SERPL-MCNC: 68.4 NG/ML (ref 30–100)
ALBUMIN SERPL-MCNC: 4.7 G/DL (ref 3.8–4.8)
ALBUMIN/GLOB SERPL: 2.1 {RATIO} (ref 1.2–2.2)
ALP SERPL-CCNC: 61 IU/L (ref 44–121)
ALT SERPL-CCNC: 17 IU/L (ref 0–32)
AST SERPL-CCNC: 21 IU/L (ref 0–40)
BILIRUB SERPL-MCNC: 0.3 MG/DL (ref 0–1.2)
BUN SERPL-MCNC: 18 MG/DL (ref 6–24)
BUN/CREAT SERPL: 17 (ref 9–23)
CALCIUM SERPL-MCNC: 9.6 MG/DL (ref 8.7–10.2)
CHLORIDE SERPL-SCNC: 106 MMOL/L (ref 96–106)
CHOLEST SERPL-MCNC: 122 MG/DL (ref 100–199)
CO2 SERPL-SCNC: 26 MMOL/L (ref 20–29)
CREAT SERPL-MCNC: 1.06 MG/DL (ref 0.57–1)
ERYTHROCYTE [DISTWIDTH] IN BLOOD BY AUTOMATED COUNT: 13 % (ref 11.7–15.4)
GLOBULIN SER CALC-MCNC: 2.2 G/DL (ref 1.5–4.5)
GLUCOSE SERPL-MCNC: 79 MG/DL (ref 65–99)
HCT VFR BLD AUTO: 36 % (ref 34–46.6)
HDLC SERPL-MCNC: 51 MG/DL
HGB BLD-MCNC: 11.7 G/DL (ref 11.1–15.9)
IMP & REVIEW OF LAB RESULTS: NORMAL
LDLC SERPL CALC-MCNC: 62 MG/DL (ref 0–99)
MCH RBC QN AUTO: 28.3 PG (ref 26.6–33)
MCHC RBC AUTO-ENTMCNC: 32.5 G/DL (ref 31.5–35.7)
MCV RBC AUTO: 87 FL (ref 79–97)
PLATELET # BLD AUTO: 213 X10E3/UL (ref 150–450)
POTASSIUM SERPL-SCNC: 4.9 MMOL/L (ref 3.5–5.2)
PROT SERPL-MCNC: 6.9 G/DL (ref 6–8.5)
RBC # BLD AUTO: 4.13 X10E6/UL (ref 3.77–5.28)
SODIUM SERPL-SCNC: 140 MMOL/L (ref 134–144)
TRIGL SERPL-MCNC: 34 MG/DL (ref 0–149)
TSH SERPL DL<=0.005 MIU/L-ACNC: 1.31 UIU/ML (ref 0.45–4.5)
VLDLC SERPL CALC-MCNC: 9 MG/DL (ref 5–40)
WBC # BLD AUTO: 4.5 X10E3/UL (ref 3.4–10.8)

## 2022-10-05 ENCOUNTER — HOSPITAL ENCOUNTER (EMERGENCY)
Age: 47
Discharge: HOME | End: 2022-10-05
Attending: STUDENT IN AN ORGANIZED HEALTH CARE EDUCATION/TRAINING PROGRAM
Payer: COMMERCIAL

## 2022-10-05 ENCOUNTER — APPOINTMENT (OUTPATIENT)
Dept: GENERAL RADIOLOGY | Age: 47
End: 2022-10-05
Attending: STUDENT IN AN ORGANIZED HEALTH CARE EDUCATION/TRAINING PROGRAM
Payer: COMMERCIAL

## 2022-10-05 VITALS
HEIGHT: 64 IN | TEMPERATURE: 98.1 F | RESPIRATION RATE: 18 BRPM | OXYGEN SATURATION: 100 % | BODY MASS INDEX: 26.46 KG/M2 | SYSTOLIC BLOOD PRESSURE: 168 MMHG | WEIGHT: 155 LBS | DIASTOLIC BLOOD PRESSURE: 70 MMHG | HEART RATE: 88 BPM

## 2022-10-05 DIAGNOSIS — J20.9 ACUTE BRONCHITIS, UNSPECIFIED ORGANISM: ICD-10-CM

## 2022-10-05 DIAGNOSIS — R07.9 CHEST PAIN, UNSPECIFIED TYPE: Primary | ICD-10-CM

## 2022-10-05 LAB
ALBUMIN SERPL-MCNC: 3.9 G/DL (ref 3.5–5)
ALBUMIN/GLOB SERPL: 1 {RATIO} (ref 1.1–2.2)
ALP SERPL-CCNC: 74 U/L (ref 45–117)
ALT SERPL-CCNC: 27 U/L (ref 12–78)
ANION GAP SERPL CALC-SCNC: 2 MMOL/L (ref 5–15)
AST SERPL-CCNC: 17 U/L (ref 15–37)
BASOPHILS # BLD: 0.1 K/UL (ref 0–0.1)
BASOPHILS NFR BLD: 1 % (ref 0–1)
BILIRUB SERPL-MCNC: 0.2 MG/DL (ref 0.2–1)
BUN SERPL-MCNC: 11 MG/DL (ref 6–20)
BUN/CREAT SERPL: 12 (ref 12–20)
CALCIUM SERPL-MCNC: 8.8 MG/DL (ref 8.5–10.1)
CHLORIDE SERPL-SCNC: 106 MMOL/L (ref 97–108)
CO2 SERPL-SCNC: 31 MMOL/L (ref 21–32)
CREAT SERPL-MCNC: 0.9 MG/DL (ref 0.55–1.02)
D DIMER PPP FEU-MCNC: 0.19 MG/L FEU (ref 0–0.65)
DIFFERENTIAL METHOD BLD: ABNORMAL
EOSINOPHIL # BLD: 0.2 K/UL (ref 0–0.4)
EOSINOPHIL NFR BLD: 3 % (ref 0–7)
ERYTHROCYTE [DISTWIDTH] IN BLOOD BY AUTOMATED COUNT: 11.9 % (ref 11.5–14.5)
GLOBULIN SER CALC-MCNC: 3.9 G/DL (ref 2–4)
GLUCOSE SERPL-MCNC: 73 MG/DL (ref 65–100)
HCT VFR BLD AUTO: 41.5 % (ref 35–47)
HGB BLD-MCNC: 13.1 G/DL (ref 11.5–16)
IMM GRANULOCYTES # BLD AUTO: 0.1 K/UL (ref 0–0.04)
IMM GRANULOCYTES NFR BLD AUTO: 1 % (ref 0–0.5)
LYMPHOCYTES # BLD: 1.9 K/UL (ref 0.8–3.5)
LYMPHOCYTES NFR BLD: 28 % (ref 12–49)
MCH RBC QN AUTO: 27.6 PG (ref 26–34)
MCHC RBC AUTO-ENTMCNC: 31.6 G/DL (ref 30–36.5)
MCV RBC AUTO: 87.6 FL (ref 80–99)
MONOCYTES # BLD: 0.7 K/UL (ref 0–1)
MONOCYTES NFR BLD: 11 % (ref 5–13)
NEUTS SEG # BLD: 3.9 K/UL (ref 1.8–8)
NEUTS SEG NFR BLD: 56 % (ref 32–75)
NRBC # BLD: 0 K/UL (ref 0–0.01)
NRBC BLD-RTO: 0 PER 100 WBC
PLATELET # BLD AUTO: 233 K/UL (ref 150–400)
PMV BLD AUTO: 11.2 FL (ref 8.9–12.9)
POTASSIUM SERPL-SCNC: 3.6 MMOL/L (ref 3.5–5.1)
PROCALCITONIN SERPL-MCNC: <0.05 NG/ML
PROT SERPL-MCNC: 7.8 G/DL (ref 6.4–8.2)
RBC # BLD AUTO: 4.74 M/UL (ref 3.8–5.2)
SODIUM SERPL-SCNC: 139 MMOL/L (ref 136–145)
TROPONIN-HIGH SENSITIVITY: <4 NG/L (ref 0–51)
WBC # BLD AUTO: 6.8 K/UL (ref 3.6–11)

## 2022-10-05 PROCEDURE — 85379 FIBRIN DEGRADATION QUANT: CPT

## 2022-10-05 PROCEDURE — 80053 COMPREHEN METABOLIC PANEL: CPT

## 2022-10-05 PROCEDURE — 96374 THER/PROPH/DIAG INJ IV PUSH: CPT

## 2022-10-05 PROCEDURE — 84145 PROCALCITONIN (PCT): CPT

## 2022-10-05 PROCEDURE — 74011250636 HC RX REV CODE- 250/636: Performed by: STUDENT IN AN ORGANIZED HEALTH CARE EDUCATION/TRAINING PROGRAM

## 2022-10-05 PROCEDURE — 36415 COLL VENOUS BLD VENIPUNCTURE: CPT

## 2022-10-05 PROCEDURE — 85025 COMPLETE CBC W/AUTO DIFF WBC: CPT

## 2022-10-05 PROCEDURE — 84484 ASSAY OF TROPONIN QUANT: CPT

## 2022-10-05 PROCEDURE — 71046 X-RAY EXAM CHEST 2 VIEWS: CPT

## 2022-10-05 PROCEDURE — 99284 EMERGENCY DEPT VISIT MOD MDM: CPT

## 2022-10-05 RX ORDER — KETOROLAC TROMETHAMINE 30 MG/ML
30 INJECTION, SOLUTION INTRAMUSCULAR; INTRAVENOUS
Status: COMPLETED | OUTPATIENT
Start: 2022-10-05 | End: 2022-10-05

## 2022-10-05 RX ADMIN — SODIUM CHLORIDE 1000 ML: 9 INJECTION, SOLUTION INTRAVENOUS at 20:10

## 2022-10-05 RX ADMIN — KETOROLAC TROMETHAMINE 30 MG: 30 INJECTION, SOLUTION INTRAMUSCULAR at 20:10

## 2022-10-05 NOTE — ED PROVIDER NOTES
Patient is a 30-year-old female with a history of mucoepidermoid carcinoma of the hard palate, hypertension, adjustment disorder who presents to ED complaining of chest pain which started this morning. Patient reports chest feels \"funny\" and also reports chest tightness. Patient reports she started not feeling well 6 days ago. She c/o fever, chills, cough, shortness of breath, and myalgias. Seen at [facility] and diagnosed with bronchitis, started on prednisone dose pack and doxycycline. Patient reports she finished the prednisone yesterday. Today she started with chest pain and reports bodyaches and overall not feeling well returned, cough has improved. She denies similar sx previously. She also denies any palpitations, leg swelling, abdominal pain, N/V/D, urinary symptoms. Past Medical History:   Diagnosis Date    Adjustment disorder     Cancer ([facility] [address]     mucoepidermoid carcinoma left hard palate    Dysmenorrhea     Essential hypertension     Borderline    Positive PPD, treated     INH (x9 months)       Past Surgical History:   Procedure Laterality Date    HX GYN  1996    cryotherapy (abnl. Pap)    HX HEENT           Family History:   Problem Relation Age of Onset    Hypertension Mother     High Cholesterol Mother     Sudden Death Father 46        Drug abuse    Hypertension Father     Cancer Maternal Grandmother        Social History     Socioeconomic History    Marital status: SINGLE     Spouse name: Not on file    Number of children: Not on file    Years of education: Not on file    Highest education level: Not on file   Occupational History    Not on file   Tobacco Use    Smoking status: Never    Smokeless tobacco: Never   Substance and Sexual Activity    Alcohol use:  Yes     Alcohol/week: 0.8 standard drinks     Types: 1 Glasses of wine per week    Drug use: No    Sexual activity: Yes     Partners: Male     Birth control/protection: Pill   Other Topics Concern    Not on file   Social History Narrative Not on file     Social Determinants of Health     Financial Resource Strain: Not on file   Food Insecurity: Not on file   Transportation Needs: Not on file   Physical Activity: Not on file   Stress: Not on file   Social Connections: Not on file   Intimate Partner Violence: Not on file   Housing Stability: Not on file         ALLERGIES: Advil [ibuprofen], Codeine, and Sulfa (sulfonamide antibiotics)    Review of Systems   Constitutional:  Positive for chills and fever. Negative for activity change and appetite change. HENT:  Negative for congestion and sore throat. Eyes:  Negative for pain and visual disturbance. Respiratory:  Positive for cough, chest tightness and shortness of breath. Cardiovascular:  Positive for chest pain. Negative for palpitations and leg swelling. Gastrointestinal:  Negative for abdominal distention, abdominal pain, constipation, diarrhea, nausea and vomiting. Genitourinary:  Negative for decreased urine volume, dysuria, flank pain, frequency and urgency. Musculoskeletal:  Positive for myalgias. Negative for back pain and neck pain. Skin:  Negative for rash and wound. Allergic/Immunologic: Negative for immunocompromised state. Neurological:  Negative for dizziness, syncope, weakness, light-headedness, numbness and headaches. Psychiatric/Behavioral:  Negative for confusion. All other systems reviewed and are negative. Vitals:    10/05/22 1942 10/05/22 1948   BP: (!) 168/70    Pulse: (!) 101    Resp: 18    Temp: 98 °F (36.7 °C) 98.1 °F (36.7 °C)   SpO2: 100%    Weight: 70.3 kg (155 lb)    Height: 5' 4\" (1.626 m)             Physical Exam  Vitals and nursing note reviewed. Constitutional:       General: She is not in acute distress. Appearance: Normal appearance. She is well-developed. She is not toxic-appearing. HENT:      Head: Normocephalic and atraumatic.       Nose: Nose normal.      Mouth/Throat:      Mouth: Mucous membranes are moist.   Eyes: General: Lids are normal.      Extraocular Movements: Extraocular movements intact. Conjunctiva/sclera: Conjunctivae normal.   Cardiovascular:      Rate and Rhythm: Regular rhythm. Tachycardia present. Pulses: Normal pulses. Heart sounds: Normal heart sounds, S1 normal and S2 normal.   Pulmonary:      Effort: Pulmonary effort is normal. No accessory muscle usage or respiratory distress. Breath sounds: Normal breath sounds. No stridor. No wheezing, rhonchi or rales. Abdominal:      Palpations: Abdomen is soft. Tenderness: There is no abdominal tenderness. There is no guarding or rebound. Musculoskeletal:         General: Normal range of motion. Cervical back: Normal range of motion and neck supple. Skin:     General: Skin is warm and dry. Capillary Refill: Capillary refill takes less than 2 seconds. Neurological:      General: No focal deficit present. Mental Status: She is alert and oriented to person, place, and time. Mental status is at baseline. Psychiatric:         Attention and Perception: Attention normal.         Mood and Affect: Mood and affect normal.         Speech: Speech normal.         Behavior: Behavior is cooperative. Thought Content: Thought content normal.         Cognition and Memory: Cognition normal.         Judgment: Judgment normal.        MDM  Number of Diagnoses or Management Options  Diagnosis management comments: Patient presents with chest pain, myalgias, fever, chills, cough and shortness of breath. URI symptoms started 6 days ago and patient was diagnosed with bronchitis and started on doxycycline and prednisone. Vital signs stable. EKG normal sinus rhythm. No ischemic changes noted. Troponin 4, low risk to rule out ACS. D-dimer negative and patient is low risk PERC criteria in order to rule out PE. No leukocytosis noted. Labs are otherwise unremarkable. Chest x-ray does not show any acute abnormalities.   Feel that patient's pain is likely pleuritic in nature. Discussed results and treatment plan with patient. Advise follow-up with PCP. Strict return to ER precautions terri in detail with patient. All questions addressed and answered. Patient or stands and agrees with plan. Amount and/or Complexity of Data Reviewed  Clinical lab tests: reviewed  Tests in the radiology section of CPT®: reviewed  Discuss the patient with other providers: yes (Dr. Mone Garza, ED attending )      ED Course as of 10/05/22 2059   Wed Oct 05, 2022   1943 EKG interpretation:   Rhythm: normal sinus rhythm; and regular . Rate (approx.): 98; Axis: normal; Intervals: normal ; ST/T wave: normal; EKG documented and interpreted by Pily Pireto. Mone Garza MD, Emergency Medicine.     [AL]   2045 CBC WITH AUTOMATED DIFF(!):    WBC 6.8   RBC 4.74   HGB 13.1   HCT 41.5   MCV 87.6   MCH 27.6   MCHC 31.6   RDW 11.9   PLATELET 089   MPV 59.9   NRBC 0.0   ABSOLUTE NRBC 0.00   NEUTROPHILS 56   LYMPHOCYTES 28   MONOCYTES 11   EOSINOPHILS 3   BASOPHILS 1   IMMATURE GRANULOCYTES 1(!)   ABS. NEUTROPHILS 3.9   ABS. LYMPHOCYTES 1.9   ABS. MONOCYTES 0.7   ABS. EOSINOPHILS 0.2   ABS. BASOPHILS 0.1   ABS. IMM. GRANS. 0.1(!)   DF AUTOMATED [KG]   3682 METABOLIC PANEL, COMPREHENSIVE(!):    Sodium 139   Potassium 3.6   Chloride 106   CO2 31   Anion gap 2(!)   Glucose 73   BUN 11   Creatinine 0.90   BUN/Creatinine ratio 12   eGFR >60   Calcium 8.8   Bilirubin, total 0.2   ALT 27   AST 17   Alk.  phosphatase 74   Protein, total 7.8   Albumin 3.9   Globulin 3.9   A-G Ratio 1.0(!) [KG]   2045 TROPONIN-HIGH SENSITIVITY:    Troponin-High Sensitivity <4 [KG]   2045 D DIMER:    D-dimer 0.19 [KG]      ED Course User Index  [AL] Lindsay Bowens MD  [KG] Paula Chi, [address]       Procedures

## 2022-10-05 NOTE — ED TRIAGE NOTES
Diagnosed with Bronchitis and possible pneumonia on Thursday at [facility], has been taking steroid and antibiotic. Reports \" my chest feels funny and deb had body aches since this morning\".

## 2022-10-06 NOTE — DISCHARGE INSTRUCTIONS
Recommend alternating Tylenol and ibuprofen as needed for pain. Continue doxycycline as prescribed. Ensure you are getting adequate rest.  Please follow-up with your primary care physician in 2 days for reeval.  If you develop new or worsening symptoms please return to the ER.

## 2022-10-26 NOTE — PROGRESS NOTES
Subjective:   41 y.o. female for Well Woman Check. Her gyne and breast care is done elsewhere by her Ob-Gyne physician. Patient Active Problem List    Diagnosis Date Noted    Mucoepidermoid carcinoma of hard palate ([facility] [address] 12/10/2012    Chest pain, unspecified 11/14/2011    Allergic rhinitis 11/08/2011    Borderline blood pressure 11/08/2011     Current Outpatient Medications   Medication Sig Dispense Refill    SUMAtriptan (IMITREX) 100 mg tablet TAKE 1 TABLET BY MOUTH ONCE AS NEEDED FOR MENSTRUAL MIGRAINES FOR UP TO 1 DOSE      fish oil/borage/flax/om3,6,9 1 (OMEGA 3-6-9 PO) Take 2 Capsules by mouth two (2) times a day. TURMERIC PO Take 1,200 Caplet by mouth daily. ASHWAGANDHA ROOT EXTRACT PO Take 1,200 mg by mouth two (2) times a day. PSYLLIUM HUSK PO Take 1,000 mg by mouth two (2) times a day. VITAMIN B COMPLEX PO Take 1 Tablet by mouth two (2) times a day. OTHER Take 600 mg by mouth two (2) times a day. Shatavari      fexofenadine-pseudoephedrine (ALLEGRA-D)  mg Tb12 Take 1 Tablet by mouth every twelve (12) hours as needed. ketotifen (ZADITOR) 0.025 % (0.035 %) ophthalmic solution Administer 1 Drop to both eyes two (2) times daily as needed. ACETAMINOPHEN (TYLENOL 8 HOUR PO) Take  by mouth. As needed      multivitamin (ONE A DAY) tablet Take 1 Tab by mouth daily. CHOLECALCIFEROL, VITAMIN D3, (VITAMIN D3 PO) Take 2,000 Units by mouth. Once daily      IBUPROFEN (ADVIL PO) Take  by mouth as needed.        Allergies   Allergen Reactions    Advil [Ibuprofen] Rash     Drug induced eruption    Codeine Unknown (comments)    Sulfa (Sulfonamide Antibiotics) Unknown (comments)     Vomiting     Past Medical History:   Diagnosis Date    Adjustment disorder     Cancer ([facility] [address]     mucoepidermoid carcinoma left hard palate    Dysmenorrhea     Essential hypertension     Borderline    Positive PPD, treated     INH (x9 months)     Past Surgical History:   Procedure Laterality Date HX GYN  1996    cryotherapy (abnl. Pap)    HX HEENT       Family History   Problem Relation Age of Onset    Hypertension Mother     High Cholesterol Mother     Sudden Death Father 46        Drug abuse    Hypertension Father     Cancer Maternal Grandmother      Social History     Tobacco Use    Smoking status: Never    Smokeless tobacco: Never   Substance Use Topics    Alcohol use: Yes     Alcohol/week: 0.8 standard drinks     Types: 1 Glasses of wine per week        Lab Results   Component Value Date/Time    WBC 6.8 10/05/2022 08:02 PM    HGB 13.1 10/05/2022 08:02 PM    HCT 41.5 10/05/2022 08:02 PM    PLATELET 615 48/97/8611 08:02 PM    MCV 87.6 10/05/2022 08:02 PM     Lab Results   Component Value Date/Time    Glucose 73 10/05/2022 08:02 PM    LDL, calculated 62 10/28/2021 09:41 AM    LDL, calculated 86.2 07/21/2020 11:45 AM    Creatinine 0.90 10/05/2022 08:02 PM      Lab Results   Component Value Date/Time    Cholesterol, total 122 10/28/2021 09:41 AM    HDL Cholesterol 51 10/28/2021 09:41 AM    LDL, calculated 62 10/28/2021 09:41 AM    LDL, calculated 86.2 07/21/2020 11:45 AM    Triglyceride 34 10/28/2021 09:41 AM    CHOL/HDL Ratio 3.1 07/21/2020 11:45 AM     Lab Results   Component Value Date/Time    ALT (SGPT) 27 10/05/2022 08:02 PM    Alk.  phosphatase 74 10/05/2022 08:02 PM    Bilirubin, direct 0.1 09/01/2009 05:26 PM    Bilirubin, total 0.2 10/05/2022 08:02 PM    Albumin 3.9 10/05/2022 08:02 PM    Protein, total 7.8 10/05/2022 08:02 PM    PLATELET 664 26/67/9141 08:02 PM       Lab Results   Component Value Date/Time    GFR est non-AA 64 10/28/2021 09:41 AM    GFR est AA 74 10/28/2021 09:41 AM    Creatinine 0.90 10/05/2022 08:02 PM    BUN 11 10/05/2022 08:02 PM    Sodium 139 10/05/2022 08:02 PM    Potassium 3.6 10/05/2022 08:02 PM    Chloride 106 10/05/2022 08:02 PM    CO2 31 10/05/2022 08:02 PM     Lab Results   Component Value Date/Time    TSH 1.310 10/28/2021 09:41 AM         Specific concerns today: Bronchitis last month, started early September, went to [facility] 9/19, told just a cough, continued so went back to [facility] on 9/29, diagnosed with bronchitis, placed on Doxy and steroids. Never had fevers. Early October went to ER at [facility] for chest pain. Xray was negative. Given fluids and pain medications and discharged home. Home COVID testing x 2 was negative. Feels much better but still testing to cough between 9am and 5pm. Cough last Friday was yellow, since then clear. No sob . Mild wheezing. Left nostril is congested at night. Tried Mucinex x 1 this week without improvement in cough. Currently not taking Allegra D. Menstrual migraines. Tried Sumatriptan. Headaches are much improved. Duglas Thrasher Ma -   Colonoscopy - given referal this year. Review of Systems  Constitutional: negative  Eyes: negative  Ears, nose, mouth, throat, and face: negative except for mild jaw pain due to clenching. Respiratory: negative except for cough  Cardiovascular: negative  Gastrointestinal: negative  Genitourinary:negative except for mild stress incontinence  Integument/breast: negative  Hematologic/lymphatic: negative  Musculoskeletal:negative  Neurological: negative except for menstrual migraines as above. Dizziness x 1 day this summer. Behavioral/Psych: negative  Endocrine: negative  Allergic/Immunologic: negative except for seasonal allergies    Objective:   Blood pressure (!) 127/59, pulse 82, temperature 98.2 °F (36.8 °C), temperature source Temporal, resp. rate 18, height 5' 4\" (1.626 m), weight 174 lb (78.9 kg), SpO2 100 %, unknown if currently breastfeeding.    Physical Examination:   General appearance - alert, well appearing, and in no distress and oriented to person, place, and time  Mental status - alert, oriented to person, place, and time, normal mood, behavior, speech, dress, motor activity, and thought processes  Eyes - pupils equal and reactive, extraocular eye movements intact  Ears - bilateral TM's and external ear canals normal  Nose - nares edematous with clear discharge. Mouth - mucous membranes moist, pharynx normal without lesions  Neck - supple, no significant adenopathy, carotids upstroke normal bilaterally, no bruits, thyroid exam: thyroid is normal in size without nodules or tenderness  Lymphatics - no palpable lymphadenopathy, no hepatosplenomegaly  Chest - clear to auscultation, no wheezes, rales or rhonchi, symmetric air entry  Heart - normal rate, regular rhythm, normal S1, S2, no murmurs, rubs, clicks or gallops  Abdomen - soft, nontender, nondistended, no masses or organomegaly  bowel sounds normal  Breasts - breasts appear normal, no suspicious masses, no skin or nipple changes or axillary nodes  Back exam - full range of motion, no tenderness, palpable spasm or pain on motion  Neurological - alert, oriented, normal speech, no focal findings or movement disorder noted  Musculoskeletal - no joint tenderness, deformity or swelling  Extremities - peripheral pulses normal, no pedal edema, no clubbing or cyanosis  Skin - normal coloration and turgor, no rashes, no suspicious skin lesions noted     Assessment/Plan:   41yo female here for CPE. call if any problems  Diagnoses and all orders for this visit:    1. Routine general medical examination at a health care facility -  labs from ER visit reviewed with patient. Nml CBC and CMP. Check rest of screening labs. -     TSH 3RD GENERATION; Future  -     LIPID PANEL; Future  -     VITAMIN D, 25 HYDROXY; Future    2. 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 Vitamin D deficiency - controlled in past, continue Vit D supplements. Stop calcium supplements. Repeat levels. -     VITAMIN D, 25 HYDROXY; Future    4. Post bronchitis cough - ? From allergies. Add Allegra D daily and saline nasal spray 2 squirts each nostril 2-3 times daily. Call if no improvement.

## 2022-10-27 ENCOUNTER — OFFICE VISIT (OUTPATIENT)
Dept: INTERNAL MEDICINE CLINIC | Age: 47
End: 2022-10-27
Payer: COMMERCIAL

## 2022-10-27 VITALS
TEMPERATURE: 98.2 F | SYSTOLIC BLOOD PRESSURE: 127 MMHG | RESPIRATION RATE: 18 BRPM | HEIGHT: 64 IN | BODY MASS INDEX: 29.71 KG/M2 | DIASTOLIC BLOOD PRESSURE: 59 MMHG | WEIGHT: 174 LBS | OXYGEN SATURATION: 100 % | HEART RATE: 82 BPM

## 2022-10-27 DIAGNOSIS — E55.9 VITAMIN D DEFICIENCY: ICD-10-CM

## 2022-10-27 DIAGNOSIS — Z00.00 ROUTINE GENERAL MEDICAL EXAMINATION AT A HEALTH CARE FACILITY: Primary | ICD-10-CM

## 2022-10-27 DIAGNOSIS — C05.0 MUCOEPIDERMOID CARCINOMA OF HARD PALATE (HCC): ICD-10-CM

## 2022-10-27 PROCEDURE — 99396 PREV VISIT EST AGE 40-64: CPT | Performed by: INTERNAL MEDICINE

## 2022-10-27 RX ORDER — SUMATRIPTAN 100 MG/1
TABLET, FILM COATED ORAL
COMMUNITY
Start: 2022-09-01 | End: 2022-11-21 | Stop reason: SDUPTHER

## 2022-10-27 NOTE — PROGRESS NOTES
Reviewed record in preparation for visit and have obtained necessary documentation. Identified pt with two pt identifiers(name and ). Chief Complaint   Patient presents with    Physical     Patient states she was diagnosed with bronchitis last month and still has a lingering cough. Medication Refill     Blood pressure (!) 127/59, pulse 82, temperature 98.2 °F (36.8 °C), temperature source Temporal, resp. rate 18, height 5' 4\" (1.626 m), weight 174 lb (78.9 kg), SpO2 100 %, unknown if currently breastfeeding. Health Maintenance Due   Topic Date Due    Hepatitis C Test  Never done    Cervical cancer screen  2019    COVID-19 Vaccine (3 - Booster) 2021    Colorectal Screening  Never done    Depresssion Screening  10/21/2022       Ms. Swetha Noble has a reminder for a \"due or due soon\" health maintenance. I have asked that she discuss this further with her primary care provider for follow-up on this health maintenance. Coordination of Care Questionnaire:  :     1) Have you been to an emergency room, urgent care clinic since your last visit? yes Patient went twice to [facility] in sept , Early October she went to [facility] for chest pain. Patient states both visits were linked to bronchitis   Hospitalized since your last visit? no             2) Have you seen or consulted any other health care providers outside of [address] [place] [address] since your last visit? yes  Oncologist at [facility]     3) In the event something were to happen to you and you were unable to speak on your behalf, do you have an Advance Directive/ Living Will in place stating your wishes?  YES

## 2022-11-21 ENCOUNTER — TELEPHONE (OUTPATIENT)
Dept: INTERNAL MEDICINE CLINIC | Age: 47
End: 2022-11-21

## 2022-11-21 RX ORDER — SUMATRIPTAN 100 MG/1
TABLET, FILM COATED ORAL
Qty: 27 TABLET | Refills: 3 | Status: SHIPPED | OUTPATIENT
Start: 2022-11-21

## 2022-11-21 RX ORDER — FLUTICASONE PROPIONATE AND SALMETEROL 100; 50 UG/1; UG/1
1 POWDER RESPIRATORY (INHALATION) 2 TIMES DAILY
Qty: 60 EACH | Refills: 0 | Status: SHIPPED | OUTPATIENT
Start: 2022-11-21

## 2022-11-21 NOTE — TELEPHONE ENCOUNTER
Regarding: Chest congestion/sumatriptan refill  ----- Message from Dilshad Boudreaux sent at 11/21/2022  7:39 AM EST -----       ----- Message from Stas Billingsley to Thomas Dee MD sent at 11/20/2022 10:44 AM -----   Dr. Oscar Lester,     I am still experiencing day time chest congestion (with phlegm production) post my September 2022 bronchitis diagnosis. Since my last visit with you, I've been using Allegra D and Xlear nasal spray to treat the congestion. This regimen has helped some, but not fully. Please advise as to another protocol that might be efficacious in the treatment of my congestion. Also, may I please have a refill of the sumatriptan? Lastly, I have not forgotten about my lab work. I plan to get it done in the coming weeks. Thank you and take care. Prabhjot Graham, PhD, [place]([facility])

## 2023-04-29 ENCOUNTER — HOSPITAL ENCOUNTER (EMERGENCY)
Age: 48
Discharge: HOME | End: 2023-04-29
Attending: EMERGENCY MEDICINE
Payer: COMMERCIAL

## 2023-04-29 ENCOUNTER — APPOINTMENT (OUTPATIENT)
Dept: GENERAL RADIOLOGY | Age: 48
End: 2023-04-29
Attending: EMERGENCY MEDICINE
Payer: COMMERCIAL

## 2023-04-29 VITALS
TEMPERATURE: 97.6 F | HEIGHT: 64 IN | OXYGEN SATURATION: 95 % | WEIGHT: 165 LBS | SYSTOLIC BLOOD PRESSURE: 108 MMHG | RESPIRATION RATE: 18 BRPM | HEART RATE: 61 BPM | BODY MASS INDEX: 28.17 KG/M2 | DIASTOLIC BLOOD PRESSURE: 65 MMHG

## 2023-04-29 DIAGNOSIS — R10.84 ABDOMINAL PAIN, GENERALIZED: Primary | ICD-10-CM

## 2023-04-29 DIAGNOSIS — K59.00 CONSTIPATION, UNSPECIFIED CONSTIPATION TYPE: ICD-10-CM

## 2023-04-29 DIAGNOSIS — N30.00 ACUTE CYSTITIS WITHOUT HEMATURIA: ICD-10-CM

## 2023-04-29 LAB
ALBUMIN SERPL-MCNC: 3.9 G/DL (ref 3.5–5)
ALBUMIN/GLOB SERPL: 1.1 (ref 1.1–2.2)
ALP SERPL-CCNC: 60 U/L (ref 45–117)
ALT SERPL-CCNC: 22 U/L (ref 12–78)
ANION GAP SERPL CALC-SCNC: 6 MMOL/L (ref 5–15)
APPEARANCE UR: ABNORMAL
AST SERPL-CCNC: 15 U/L (ref 15–37)
BACTERIA URNS QL MICRO: ABNORMAL /HPF
BASOPHILS # BLD: 0 K/UL (ref 0–0.1)
BASOPHILS NFR BLD: 0 % (ref 0–1)
BILIRUB SERPL-MCNC: 0.4 MG/DL (ref 0.2–1)
BILIRUB UR QL CFM: NEGATIVE
BUN SERPL-MCNC: 10 MG/DL (ref 6–20)
BUN/CREAT SERPL: 10 (ref 12–20)
CALCIUM SERPL-MCNC: 9 MG/DL (ref 8.5–10.1)
CHLORIDE SERPL-SCNC: 109 MMOL/L (ref 97–108)
CO2 SERPL-SCNC: 27 MMOL/L (ref 21–32)
COLOR UR: ABNORMAL
CREAT SERPL-MCNC: 0.99 MG/DL (ref 0.55–1.02)
DIFFERENTIAL METHOD BLD: NORMAL
EOSINOPHIL # BLD: 0.1 K/UL (ref 0–0.4)
EOSINOPHIL NFR BLD: 2 % (ref 0–7)
EPITH CASTS URNS QL MICRO: ABNORMAL /LPF
ERYTHROCYTE [DISTWIDTH] IN BLOOD BY AUTOMATED COUNT: 13.1 % (ref 11.5–14.5)
GLOBULIN SER CALC-MCNC: 3.6 G/DL (ref 2–4)
GLUCOSE SERPL-MCNC: 104 MG/DL (ref 65–100)
GLUCOSE UR STRIP.AUTO-MCNC: NEGATIVE MG/DL
HCT VFR BLD AUTO: 42.4 % (ref 35–47)
HGB BLD-MCNC: 13.4 G/DL (ref 11.5–16)
HGB UR QL STRIP: NEGATIVE
HYALINE CASTS URNS QL MICRO: ABNORMAL /LPF (ref 0–2)
IMM GRANULOCYTES # BLD AUTO: 0 K/UL (ref 0–0.04)
IMM GRANULOCYTES NFR BLD AUTO: 0 % (ref 0–0.5)
KETONES UR QL STRIP.AUTO: ABNORMAL MG/DL
LEUKOCYTE ESTERASE UR QL STRIP.AUTO: ABNORMAL
LIPASE SERPL-CCNC: 105 U/L (ref 73–393)
LYMPHOCYTES # BLD: 2.2 K/UL (ref 0.8–3.5)
LYMPHOCYTES NFR BLD: 26 % (ref 12–49)
MCH RBC QN AUTO: 27.4 PG (ref 26–34)
MCHC RBC AUTO-ENTMCNC: 31.6 G/DL (ref 30–36.5)
MCV RBC AUTO: 86.7 FL (ref 80–99)
MONOCYTES # BLD: 0.6 K/UL (ref 0–1)
MONOCYTES NFR BLD: 7 % (ref 5–13)
NEUTS SEG # BLD: 5.6 K/UL (ref 1.8–8)
NEUTS SEG NFR BLD: 65 % (ref 32–75)
NITRITE UR QL STRIP.AUTO: NEGATIVE
NRBC # BLD: 0 K/UL (ref 0–0.01)
NRBC BLD-RTO: 0 PER 100 WBC
PH UR STRIP: 8.5 (ref 5–8)
PLATELET # BLD AUTO: 176 K/UL (ref 150–400)
PMV BLD AUTO: 11.6 FL (ref 8.9–12.9)
POTASSIUM SERPL-SCNC: 3.5 MMOL/L (ref 3.5–5.1)
PROT SERPL-MCNC: 7.5 G/DL (ref 6.4–8.2)
PROT UR STRIP-MCNC: 30 MG/DL
RBC # BLD AUTO: 4.89 M/UL (ref 3.8–5.2)
RBC #/AREA URNS HPF: ABNORMAL /HPF (ref 0–5)
SODIUM SERPL-SCNC: 142 MMOL/L (ref 136–145)
SP GR UR REFRACTOMETRY: 1.02
UA: UC IF INDICATED,UAUC: ABNORMAL
UROBILINOGEN UR QL STRIP.AUTO: 1 EU/DL (ref 0.2–1)
WBC # BLD AUTO: 8.6 K/UL (ref 3.6–11)
WBC URNS QL MICRO: ABNORMAL /HPF (ref 0–4)

## 2023-04-29 PROCEDURE — 74011250636 HC RX REV CODE- 250/636: Performed by: EMERGENCY MEDICINE

## 2023-04-29 PROCEDURE — 85025 COMPLETE CBC W/AUTO DIFF WBC: CPT

## 2023-04-29 PROCEDURE — 74011250637 HC RX REV CODE- 250/637: Performed by: EMERGENCY MEDICINE

## 2023-04-29 PROCEDURE — 83690 ASSAY OF LIPASE: CPT

## 2023-04-29 PROCEDURE — 96374 THER/PROPH/DIAG INJ IV PUSH: CPT

## 2023-04-29 PROCEDURE — 36415 COLL VENOUS BLD VENIPUNCTURE: CPT

## 2023-04-29 PROCEDURE — 80053 COMPREHEN METABOLIC PANEL: CPT

## 2023-04-29 PROCEDURE — 74022 RADEX COMPL AQT ABD SERIES: CPT

## 2023-04-29 PROCEDURE — 87086 URINE CULTURE/COLONY COUNT: CPT

## 2023-04-29 PROCEDURE — 99284 EMERGENCY DEPT VISIT MOD MDM: CPT

## 2023-04-29 PROCEDURE — 81001 URINALYSIS AUTO W/SCOPE: CPT

## 2023-04-29 RX ORDER — HYOSCYAMINE SULFATE 0.12 MG/1
0.12 TABLET SUBLINGUAL
Status: COMPLETED | OUTPATIENT
Start: 2023-04-29 | End: 2023-04-29

## 2023-04-29 RX ORDER — CEPHALEXIN 250 MG/1
500 CAPSULE ORAL
Status: COMPLETED | OUTPATIENT
Start: 2023-04-29 | End: 2023-04-29

## 2023-04-29 RX ORDER — CEPHALEXIN 500 MG/1
500 CAPSULE ORAL 3 TIMES DAILY
Qty: 15 CAPSULE | Refills: 0 | Status: SHIPPED | OUTPATIENT
Start: 2023-04-29 | End: 2023-04-29 | Stop reason: SDUPTHER

## 2023-04-29 RX ORDER — ONDANSETRON 2 MG/ML
4 INJECTION INTRAMUSCULAR; INTRAVENOUS
Status: COMPLETED | OUTPATIENT
Start: 2023-04-29 | End: 2023-04-29

## 2023-04-29 RX ORDER — ONDANSETRON 4 MG/1
4 TABLET, ORALLY DISINTEGRATING ORAL
Qty: 10 TABLET | Refills: 0 | Status: SHIPPED | OUTPATIENT
Start: 2023-04-29

## 2023-04-29 RX ORDER — ONDANSETRON 4 MG/1
4 TABLET, ORALLY DISINTEGRATING ORAL
Qty: 10 TABLET | Refills: 0 | Status: SHIPPED | OUTPATIENT
Start: 2023-04-29 | End: 2023-04-29 | Stop reason: SDUPTHER

## 2023-04-29 RX ORDER — CEPHALEXIN 500 MG/1
500 CAPSULE ORAL 3 TIMES DAILY
Qty: 15 CAPSULE | Refills: 0 | Status: SHIPPED | OUTPATIENT
Start: 2023-04-29

## 2023-04-29 RX ADMIN — CEPHALEXIN 500 MG: 250 CAPSULE ORAL at 04:52

## 2023-04-29 RX ADMIN — HYOSCYAMINE SULFATE 0.12 MG: 0.12 TABLET ORAL; SUBLINGUAL at 03:44

## 2023-04-29 RX ADMIN — ONDANSETRON 4 MG: 2 INJECTION INTRAMUSCULAR; INTRAVENOUS at 03:37

## 2023-04-29 RX ADMIN — SODIUM CHLORIDE 1000 ML: 9 INJECTION, SOLUTION INTRAVENOUS at 03:37

## 2023-04-29 NOTE — DISCHARGE INSTRUCTIONS
Tomorrow would recommend 3 scoops of Miralax in 32 ounces and drink over one hr if not having a BM by mid-afternoon. Would recommend one additional scoop. Make sure you are drinking plent oy water ~64ounce a day to stay well hydrated as dehydration can result in abdominal cramping and/or constipation issues.

## 2023-04-29 NOTE — ED PROVIDER NOTES
[facility] EMERGENCY DEPT  EMERGENCY DEPARTMENT ENCOUNTER       Pt Name: Angel Luis Taylor  MRN: [id number]  [place] 1975  Date of evaluation: 2023  Provider: Nic Price DO   PCP: Johnna Antunez MD  Note Started: 5:27 AM 23     CHIEF COMPLAINT       Chief Complaint   Patient presents with    Abdominal Pain     Woke with Nausea, lower abd pain, felt like I was going to pass out on the toilet. HISTORY OF PRESENT ILLNESS: 1 or more elements      History From: patient, History limited by: none     Angel Luis Taylor is a 57 y.o. female presents to the emergency room by private vehicle secondary to abdominal pain. Patient states she had onset of abdominal pain yesterday that progressively worsened throughout the day. She states currently her pain is 10 out of 10 and she states diffuse. She states last bowel movement was Thursday and she normally has at least 1 if not 2 bowel movements a day. She states earlier this evening she was having a lot of pain and discomfort and felt like she needed to defecate and went to go sit on the toilet and then began to feel nauseated. During this time patient became pale and felt like she was going to pass out. She currently does not have this feeling. She still has some mild nausea. She denies any fever or chills. She denies any chest pain or shortness of breath. She denies any melena, hematochezia or bright red blood per rectum. Prior abdominal surgeries to include  only. She denies any urinary symptoms, vaginal bleeding, vaginal discharge. Please See MDM for Additional Details of the HPI/PMH  Nursing Notes were all reviewed and agreed with or any disagreements were addressed in the HPI. REVIEW OF SYSTEMS        Positives and Pertinent negatives as per HPI.     PAST HISTORY     Past Medical History:  Past Medical History:   Diagnosis Date    Adjustment disorder     Cancer ([facility] [address]     mucoepidermoid carcinoma left hard palate Dysmenorrhea     Essential hypertension     Borderline    Positive PPD, treated     INH (x9 months)       Past Surgical History:  Past Surgical History:   Procedure Laterality Date    HX GYN  1996    cryotherapy (abnl. Pap)    HX HEENT         Family History:  Family History   Problem Relation Age of Onset    Hypertension Mother     High Cholesterol Mother     Sudden Death Father 46        Drug abuse    Hypertension Father     Cancer Maternal Grandmother        Social History:  Social History     Tobacco Use    Smoking status: Never    Smokeless tobacco: Never   Substance Use Topics    Alcohol use: Yes     Alcohol/week: 0.8 standard drinks     Types: 1 Glasses of wine per week    Drug use: No       Allergies: Allergies   Allergen Reactions    Advil [Ibuprofen] Rash     Drug induced eruption    Codeine Unknown (comments)    Sulfa (Sulfonamide Antibiotics) Unknown (comments)     Vomiting       CURRENT MEDICATIONS      Current Discharge Medication List        CONTINUE these medications which have NOT CHANGED    Details   SUMAtriptan (IMITREX) 100 mg tablet TAKE 1 TABLET BY MOUTH ONCE AS NEEDED FOR MENSTRUAL MIGRAINES. MAY REPEAT DOSE X 1 IN 2 HOURS. MAX 2 TABS PER 24 HOURS. Qty: 27 Tablet, Refills: 3      fluticasone propion-salmeteroL (ADVAIR/WIXELA) 100-50 mcg/dose diskus inhaler Take 1 Puff by inhalation two (2) times a day. Qty: 60 Each, Refills: 0      fish oil/borage/flax/om3,6,9 1 (OMEGA 3-6-9 PO) Take 2 Capsules by mouth two (2) times a day. TURMERIC PO Take 1,200 Caplet by mouth daily. ASHWAGANDHA ROOT EXTRACT PO Take 1,200 mg by mouth two (2) times a day. PSYLLIUM HUSK PO Take 1,000 mg by mouth two (2) times a day. VITAMIN B COMPLEX PO Take 1 Tablet by mouth two (2) times a day. OTHER Take 600 mg by mouth two (2) times a day. Shatavari      fexofenadine-pseudoephedrine (ALLEGRA-D)  mg Tb12 Take 1 Tablet by mouth every twelve (12) hours as needed.       ketotifen (ZADITOR) 0. 025 % (0.035 %) ophthalmic solution Administer 1 Drop to both eyes two (2) times daily as needed. Associated Diagnoses: Rash of face      ACETAMINOPHEN (TYLENOL 8 HOUR PO) Take  by mouth. As needed      multivitamin (ONE A DAY) tablet Take 1 Tab by mouth daily. CHOLECALCIFEROL, VITAMIN D3, (VITAMIN D3 PO) Take 2,000 Units by mouth. Once daily      IBUPROFEN (ADVIL PO) Take  by mouth as needed. SCREENINGS               No data recorded         PHYSICAL EXAM      ED Triage Vitals [04/29/23 0306]   ED Encounter Vitals Group      /78      Pulse (Heart Rate) 61      Resp Rate 18      Temp 97.6 °F (36.4 °C)      Temp src       O2 Sat (%) 98 %      Weight 165 lb      Height 5' 4\"        Physical Exam  Vitals and nursing note reviewed. Constitutional:       General: She is not in acute distress. Appearance: She is well-developed. She is not diaphoretic. HENT:      Head: Normocephalic and atraumatic. Mouth/Throat:      Mouth: Mucous membranes are moist.      Pharynx: No oropharyngeal exudate. Eyes:      General: No scleral icterus. Extraocular Movements: Extraocular movements intact. Conjunctiva/sclera: Conjunctivae normal.      Pupils: Pupils are equal, round, and reactive to light. Neck:      Vascular: No JVD. Trachea: No tracheal deviation. Cardiovascular:      Rate and Rhythm: Normal rate and regular rhythm. Heart sounds: Normal heart sounds. No murmur heard. Pulmonary:      Effort: Pulmonary effort is normal. No respiratory distress. Breath sounds: Normal breath sounds. No stridor. No wheezing or rales. Abdominal:      General: There is no distension. Palpations: Abdomen is soft. Tenderness: There is generalized abdominal tenderness. There is no guarding or rebound. Musculoskeletal:         General: Normal range of motion. Cervical back: Normal range of motion and neck supple. Right lower leg: No edema.       Left lower leg: No edema. Skin:     General: Skin is warm and dry. Capillary Refill: Capillary refill takes less than 2 seconds. Neurological:      Mental Status: She is alert and oriented to person, place, and time. Cranial Nerves: No cranial nerve deficit. Comments: No gross motor or sensory deficits    Psychiatric:         Mood and Affect: Mood normal.         Behavior: Behavior normal.        DIAGNOSTIC RESULTS   LABS:     Recent Results (from the past 12 hour(s))   CBC WITH AUTOMATED DIFF    Collection Time: 04/29/23  3:21 AM   Result Value Ref Range    WBC 8.6 3.6 - 11.0 K/uL    RBC 4.89 3.80 - 5.20 M/uL    HGB 13.4 11.5 - 16.0 g/dL    HCT 42.4 35.0 - 47.0 %    MCV 86.7 80.0 - 99.0 FL    MCH 27.4 26.0 - 34.0 PG    MCHC 31.6 30.0 - 36.5 g/dL    RDW 13.1 11.5 - 14.5 %    PLATELET 523 150 - 774 K/uL    MPV 11.6 8.9 - 12.9 FL    NRBC 0.0 0  WBC    ABSOLUTE NRBC 0.00 0.00 - 0.01 K/uL    NEUTROPHILS 65 32 - 75 %    LYMPHOCYTES 26 12 - 49 %    MONOCYTES 7 5 - 13 %    EOSINOPHILS 2 0 - 7 %    BASOPHILS 0 0 - 1 %    IMMATURE GRANULOCYTES 0 0.0 - 0.5 %    ABS. NEUTROPHILS 5.6 1.8 - 8.0 K/UL    ABS. LYMPHOCYTES 2.2 0.8 - 3.5 K/UL    ABS. MONOCYTES 0.6 0.0 - 1.0 K/UL    ABS. EOSINOPHILS 0.1 0.0 - 0.4 K/UL    ABS. BASOPHILS 0.0 0.0 - 0.1 K/UL    ABS. IMM. GRANS. 0.0 0.00 - 0.04 K/UL    DF AUTOMATED     METABOLIC PANEL, COMPREHENSIVE    Collection Time: 04/29/23  3:21 AM   Result Value Ref Range    Sodium 142 136 - 145 mmol/L    Potassium 3.5 3.5 - 5.1 mmol/L    Chloride 109 (H) 97 - 108 mmol/L    CO2 27 21 - 32 mmol/L    Anion gap 6 5 - 15 mmol/L    Glucose 104 (H) 65 - 100 mg/dL    BUN 10 6 - 20 MG/DL    Creatinine 0.99 0.55 - 1.02 MG/DL    BUN/Creatinine ratio 10 (L) 12 - 20      eGFR >60 >60 ml/min/1.73m2    Calcium 9.0 8.5 - 10.1 MG/DL    Bilirubin, total 0.4 0.2 - 1.0 MG/DL    ALT (SGPT) 22 12 - 78 U/L    AST (SGOT) 15 15 - 37 U/L    Alk.  phosphatase 60 45 - 117 U/L    Protein, total 7.5 6.4 - 8.2 g/dL Albumin 3.9 3.5 - 5.0 g/dL    Globulin 3.6 2.0 - 4.0 g/dL    A-G Ratio 1.1 1.1 - 2.2     LIPASE    Collection Time: 04/29/23  3:21 AM   Result Value Ref Range    Lipase 105 73 - 393 U/L   URINALYSIS W/ REFLEX CULTURE    Collection Time: 04/29/23  3:21 AM    Specimen: Miscellaneous sample    Urine specimen   Result Value Ref Range    Color DARK YELLOW      Appearance CLOUDY (A) CLEAR      Specific gravity 1.018      pH (UA) 8.5 (H) 5.0 - 8.0      Protein 30 (A) NEG mg/dL    Glucose Negative NEG mg/dL    Ketone TRACE (A) NEG mg/dL    Blood Negative NEG      Urobilinogen 1.0 0.2 - 1.0 EU/dL    Nitrites Negative NEG      Leukocyte Esterase TRACE (A) NEG      UA:UC IF INDICATED URINE CULTURE ORDERED (A) CNI      WBC 10-20 0 - 4 /hpf    RBC 0-5 0 - 5 /hpf    Epithelial cells MANY (A) FEW /lpf    Bacteria 2+ (A) NEG /hpf    Hyaline cast 0-2 0 - 2 /lpf   BILIRUBIN, CONFIRM    Collection Time: 04/29/23  3:21 AM   Result Value Ref Range    Bilirubin UA, confirm Negative NEG          EKG: If performed, independent interpretation documented below in the MDM section     RADIOLOGY:  Non-plain film images such as CT, Ultrasound and MRI are read by the radiologist. Plain radiographic images are visualized and preliminarily interpreted by the ED Provider with the findings documented in the MDM section. Interpretation per the Radiologist below, if available at the time of this note:     XR ABD ACUTE W 1 V CHEST    Result Date: 4/29/2023  EXAM: XR ABD ACUTE W 1 V CHEST INDICATION: Abdominal Pain COMPARISON: Plain film exam 10/5/2022. FINDINGS: The upright chest radiograph demonstrates clear lungs and normal cardiac and mediastinal contours. There is no pleural effusion or free air under the diaphragm. Supine and upright views of the abdomen demonstrate a nonobstructive bowel gas pattern with mild to moderate pancolonic fecal retention. There is no free intraperitoneal air.  No soft tissue masses or pathologic calcifications are identified. The bones are within normal limits. Mild to moderate pancolonic fecal retention. No acute findings otherwise. PROCEDURES   Unless otherwise noted below, none  Procedures     CRITICAL CARE TIME   none    EMERGENCY DEPARTMENT COURSE and DIFFERENTIAL DIAGNOSIS/MDM   Vitals:    Vitals:    04/29/23 0306 04/29/23 0512   BP: 109/78 108/65   Pulse: 61    Resp: 18    Temp: 97.6 °F (36.4 °C)    SpO2: 98% 95%   Weight: 74.8 kg (165 lb)    Height: 5' 4\" (1.626 m)         Patient was given the following medications:  Medications   sodium chloride 0.9 % bolus infusion 1,000 mL (0 mL IntraVENous IV Completed 4/29/23 0524)   ondansetron (ZOFRAN) injection 4 mg (4 mg IntraVENous Given 4/29/23 0337)   hyoscyamine SL (LEVSIN/SL) tablet 0.125 mg (0.125 mg SubLINGual Given 4/29/23 0344)   cephALEXin (KEFLEX) capsule 500 mg (500 mg Oral Given 4/29/23 0002)       Medical Decision Making  Amount and/or Complexity of Data Reviewed  Labs: ordered. Radiology: ordered. Risk  Prescription drug management. Patient medicated for pain     Which did improve some still having some mild abdominal discomfort. Labs unremarkable. Patient's acute abdominal series does not show any signs of bowel obstruction. Does show moderate fecal content. Discussed with patient discharge plans including prescription for Zofran ODT. Discussed MiraLAX in the morning 3 scoops in 32 ounces repeat 1 scoop in the afternoon if she has not had a bowel movement. Patient discharged home    DISPOSITION/PLAN   Jackie Davey's  results have been reviewed with her. She has been counseled regarding her diagnosis, treatment, and plan. She verbally conveys understanding and agreement of the signs, symptoms, diagnosis, treatment and prognosis and additionally agrees to follow up as discussed. She also agrees with the care-plan and conveys that all of her questions have been answered.   I have also provided discharge instructions for her that include: educational information regarding their diagnosis and treatment, and list of reasons why they would want to return to the ED prior to their follow-up appointment, should her condition change. CLINICAL IMPRESSION    Discharge Note: The patient is stable for discharge home. The signs, symptoms, diagnosis, and discharge instructions have been discussed, understanding conveyed, and agreed upon. The patient is to follow up as recommended or return to ER should their symptoms worsen. PATIENT REFERRED TO:  Follow-up Information    None           DISCHARGE MEDICATIONS:  Current Discharge Medication List        START taking these medications    Details   cephALEXin (Keflex) 500 mg capsule Take 1 Capsule by mouth three (3) times daily. Qty: 15 Capsule, Refills: 0  Start date: 4/29/2023      ondansetron (ZOFRAN ODT) 4 mg disintegrating tablet Take 1 Tablet by mouth every eight (8) hours as needed for Nausea or Vomiting. Qty: 10 Tablet, Refills: 0  Start date: 4/29/2023               DISCONTINUED MEDICATIONS:  Current Discharge Medication List          I am the Primary Clinician of Record. Kendra Hager, DO (electronically signed)    (Please note that parts of this dictation were completed with voice recognition software. Quite often unanticipated grammatical, syntax, homophones, and other interpretive errors are inadvertently transcribed by the computer software. Please disregards these errors.  Please excuse any errors that have escaped final proofreading.)

## 2023-04-30 LAB
BACTERIA SPEC CULT: NORMAL
CC UR VC: NORMAL
SERVICE CMNT-IMP: NORMAL

## 2023-06-02 ENCOUNTER — APPOINTMENT (OUTPATIENT)
Facility: HOSPITAL | Age: 48
End: 2023-06-02
Payer: COMMERCIAL

## 2023-06-02 ENCOUNTER — HOSPITAL ENCOUNTER (EMERGENCY)
Facility: HOSPITAL | Age: 48
Discharge: HOME | End: 2023-06-02
Attending: EMERGENCY MEDICINE
Payer: COMMERCIAL

## 2023-06-02 VITALS
OXYGEN SATURATION: 100 % | TEMPERATURE: 98 F | HEART RATE: 90 BPM | SYSTOLIC BLOOD PRESSURE: 127 MMHG | DIASTOLIC BLOOD PRESSURE: 71 MMHG | WEIGHT: 165 LBS | RESPIRATION RATE: 16 BRPM | HEIGHT: 64 IN | BODY MASS INDEX: 28.17 KG/M2

## 2023-06-02 DIAGNOSIS — R42 VERTIGO: Primary | ICD-10-CM

## 2023-06-02 LAB
ALBUMIN SERPL-MCNC: 4.3 G/DL (ref 3.5–5)
ALBUMIN/GLOB SERPL: 1 (ref 1.1–2.2)
ALP SERPL-CCNC: 62 U/L (ref 45–117)
ALT SERPL-CCNC: 21 U/L (ref 12–78)
ANION GAP SERPL CALC-SCNC: 4 MMOL/L (ref 5–15)
AST SERPL-CCNC: 27 U/L (ref 15–37)
BASOPHILS # BLD: 0 K/UL (ref 0–0.1)
BASOPHILS NFR BLD: 0 % (ref 0–1)
BILIRUB SERPL-MCNC: 1 MG/DL (ref 0.2–1)
BUN SERPL-MCNC: 12 MG/DL (ref 6–20)
BUN/CREAT SERPL: 11 (ref 12–20)
CALCIUM SERPL-MCNC: 9.2 MG/DL (ref 8.5–10.1)
CHLORIDE SERPL-SCNC: 106 MMOL/L (ref 97–108)
CO2 SERPL-SCNC: 27 MMOL/L (ref 21–32)
COMMENT:: NORMAL
CREAT SERPL-MCNC: 1.09 MG/DL (ref 0.55–1.02)
DIFFERENTIAL METHOD BLD: ABNORMAL
EOSINOPHIL # BLD: 0 K/UL (ref 0–0.4)
EOSINOPHIL NFR BLD: 0 % (ref 0–7)
ERYTHROCYTE [DISTWIDTH] IN BLOOD BY AUTOMATED COUNT: 12.9 % (ref 11.5–14.5)
GLOBULIN SER CALC-MCNC: 4.5 G/DL (ref 2–4)
GLUCOSE BLD STRIP.AUTO-MCNC: 122 MG/DL (ref 65–117)
GLUCOSE SERPL-MCNC: 122 MG/DL (ref 65–100)
HCT VFR BLD AUTO: 42.4 % (ref 35–47)
HGB BLD-MCNC: 13.7 G/DL (ref 11.5–16)
IMM GRANULOCYTES # BLD AUTO: 0 K/UL (ref 0–0.04)
IMM GRANULOCYTES NFR BLD AUTO: 0 % (ref 0–0.5)
INR PPP: 0.9 (ref 0.9–1.1)
LYMPHOCYTES # BLD: 0.8 K/UL (ref 0.8–3.5)
LYMPHOCYTES NFR BLD: 12 % (ref 12–49)
MCH RBC QN AUTO: 27.4 PG (ref 26–34)
MCHC RBC AUTO-ENTMCNC: 32.3 G/DL (ref 30–36.5)
MCV RBC AUTO: 84.8 FL (ref 80–99)
MONOCYTES # BLD: 0.3 K/UL (ref 0–1)
MONOCYTES NFR BLD: 5 % (ref 5–13)
NEUTS SEG # BLD: 5.6 K/UL (ref 1.8–8)
NEUTS SEG NFR BLD: 83 % (ref 32–75)
NRBC # BLD: 0 K/UL (ref 0–0.01)
NRBC BLD-RTO: 0 PER 100 WBC
PLATELET # BLD AUTO: 256 K/UL (ref 150–400)
PMV BLD AUTO: 12.8 FL (ref 8.9–12.9)
POTASSIUM SERPL-SCNC: 3.8 MMOL/L (ref 3.5–5.1)
PROT SERPL-MCNC: 8.8 G/DL (ref 6.4–8.2)
PROTHROMBIN TIME: 9.9 SEC (ref 9–11.1)
RBC # BLD AUTO: 5 M/UL (ref 3.8–5.2)
RBC MORPH BLD: ABNORMAL
SERVICE CMNT-IMP: ABNORMAL
SODIUM SERPL-SCNC: 137 MMOL/L (ref 136–145)
SPECIMEN HOLD: NORMAL
TROPONIN I SERPL HS-MCNC: <4 NG/L (ref 0–51)
WBC # BLD AUTO: 6.7 K/UL (ref 3.6–11)

## 2023-06-02 PROCEDURE — 0042T CT BRAIN PERFUSION: CPT

## 2023-06-02 PROCEDURE — 6370000000 HC RX 637 (ALT 250 FOR IP): Performed by: EMERGENCY MEDICINE

## 2023-06-02 PROCEDURE — 6360000004 HC RX CONTRAST MEDICATION: Performed by: EMERGENCY MEDICINE

## 2023-06-02 PROCEDURE — 70450 CT HEAD/BRAIN W/O DYE: CPT

## 2023-06-02 PROCEDURE — 80053 COMPREHEN METABOLIC PANEL: CPT

## 2023-06-02 PROCEDURE — 82962 GLUCOSE BLOOD TEST: CPT

## 2023-06-02 PROCEDURE — 36415 COLL VENOUS BLD VENIPUNCTURE: CPT

## 2023-06-02 PROCEDURE — 85025 COMPLETE CBC W/AUTO DIFF WBC: CPT

## 2023-06-02 PROCEDURE — 93005 ELECTROCARDIOGRAM TRACING: CPT | Performed by: EMERGENCY MEDICINE

## 2023-06-02 PROCEDURE — 85610 PROTHROMBIN TIME: CPT

## 2023-06-02 PROCEDURE — 70498 CT ANGIOGRAPHY NECK: CPT

## 2023-06-02 PROCEDURE — 84484 ASSAY OF TROPONIN QUANT: CPT

## 2023-06-02 PROCEDURE — 70551 MRI BRAIN STEM W/O DYE: CPT

## 2023-06-02 PROCEDURE — 99285 EMERGENCY DEPT VISIT HI MDM: CPT

## 2023-06-02 RX ORDER — MECLIZINE HYDROCHLORIDE 25 MG/1
25 TABLET ORAL 3 TIMES DAILY PRN
Qty: 20 TABLET | Refills: 0 | Status: SHIPPED | OUTPATIENT
Start: 2023-06-02 | End: 2023-06-12

## 2023-06-02 RX ORDER — MECLIZINE HYDROCHLORIDE 25 MG/1
25 TABLET ORAL
Status: COMPLETED | OUTPATIENT
Start: 2023-06-02 | End: 2023-06-02

## 2023-06-02 RX ADMIN — IOPAMIDOL 140 ML: 755 INJECTION, SOLUTION INTRAVENOUS at 16:26

## 2023-06-02 RX ADMIN — MECLIZINE HYDROCHLORIDE 25 MG: 25 TABLET ORAL at 18:41

## 2023-06-02 ASSESSMENT — ENCOUNTER SYMPTOMS
SORE THROAT: 0
ABDOMINAL PAIN: 0
COUGH: 0
BACK PAIN: 0

## 2023-06-02 ASSESSMENT — LIFESTYLE VARIABLES
HOW OFTEN DO YOU HAVE A DRINK CONTAINING ALCOHOL: MONTHLY OR LESS
HOW MANY STANDARD DRINKS CONTAINING ALCOHOL DO YOU HAVE ON A TYPICAL DAY: 1 OR 2
HOW OFTEN DO YOU HAVE A DRINK CONTAINING ALCOHOL: MONTHLY OR LESS
HOW MANY STANDARD DRINKS CONTAINING ALCOHOL DO YOU HAVE ON A TYPICAL DAY: 1 OR 2

## 2023-06-02 NOTE — ED NOTES
Neurotele on monitor. Placed on hold by neurothaider.      Hortensia Parekh, RN  06/02/23 [id number] [facility], NEPTALI  06/02/23 2318

## 2023-06-02 NOTE — ED NOTES
MRI notified checklist is done. Pt states she is less dizzy. Able to move her head around with only intermittent dizziness.      Jefferson Umana RN  06/02/23 [id number]

## 2023-06-02 NOTE — ED TRIAGE NOTES
Patient states drank one drink last night,went to sleep, today had a nitro drink from [facility] and became dizzy around 1200. Has had no vomiting or nausea. States when she moves it feels like things are spinning. Lied on the floor of her office for hours to try to help the symptoms. Denies weakness, numbness, or tingling. Denies changes in vision. Friend reported patient had slurred speech when she called her, which has since resolved. Patient reports had a headache earlier prior to the dizziness, which has resolved.     LKW 1200  /82  Dizziness and slurred speech  VAN negative  Accucheck 122

## 2023-06-02 NOTE — ED PROVIDER NOTES
[facility] EMERGENCY DEPT  EMERGENCY DEPARTMENT ENCOUNTER      Pt Name: Stephani Hall  MRN: [id number]  Juanitagfjuany 1975  Date of evaluation: 6/2/2023  Provider: Edgardo rFiedman MD    CHIEF COMPLAINT       Chief Complaint   Patient presents with    Dizziness         HISTORY OF PRESENT ILLNESS    Isauro Mcneil is a 56 yo F who has had vertigo, constantly since noon today. She had a headache that started around 11am but is now resolved but the vertigo continues. Her friend notes that when she called her at 2:30p her speech was slurred as well but that has now improved. Additional history from independent historians:     Review of External Medical Records:     Nursing Notes were reviewed. REVIEW OF SYSTEMS       Review of Systems   Constitutional:  Negative for fever. HENT:  Negative for sore throat. Eyes:  Negative for visual disturbance. Respiratory:  Negative for cough. Cardiovascular:  Negative for chest pain. Gastrointestinal:  Negative for abdominal pain. Genitourinary:  Negative for dysuria. Musculoskeletal:  Negative for back pain. Skin:  Negative for rash. Neurological:  Positive for dizziness, speech difficulty and headaches. Except as noted above the remainder of the review of systems was reviewed and negative. PAST MEDICAL HISTORY     Past Medical History:   Diagnosis Date    Adjustment disorder     Cancer ([address]     mucoepidermoid carcinoma left hard palate    Dysmenorrhea     Essential hypertension     Borderline    Positive PPD, treated     INH (x9 months)         SURGICAL HISTORY       Past Surgical History:   Procedure Laterality Date    GYN  1996    cryotherapy (abnl.  Pap)    HEENT           CURRENT MEDICATIONS       Previous Medications    B COMPLEX VITAMINS (VITAMIN B COMPLEX PO)    Take 1 tablet by mouth 2 times daily    FEXOFENADINE-PSEUDOEPHEDRINE (ALLEGRA-D 12HR)  MG PER EXTENDED RELEASE TABLET    Take 1 tablet by mouth    KETOTIFEN

## 2023-06-03 LAB
EKG ATRIAL RATE: 88 BPM
EKG DIAGNOSIS: NORMAL
EKG P AXIS: 55 DEGREES
EKG P-R INTERVAL: 170 MS
EKG Q-T INTERVAL: 360 MS
EKG QRS DURATION: 72 MS
EKG QTC CALCULATION (BAZETT): 435 MS
EKG R AXIS: 24 DEGREES
EKG T AXIS: 37 DEGREES
EKG VENTRICULAR RATE: 88 BPM

## 2023-06-05 PROCEDURE — 93010 ELECTROCARDIOGRAM REPORT: CPT | Performed by: SPECIALIST

## 2023-06-06 ENCOUNTER — TELEPHONE (OUTPATIENT)
Age: 48
End: 2023-06-06

## 2023-06-06 NOTE — TELEPHONE ENCOUNTER
Pt called about needing ED F/U. Declined appt with another provider, and Dr. Hugo Madden had no availability until late August, so pt declined appt.

## 2023-06-21 ENCOUNTER — OFFICE VISIT (OUTPATIENT)
Age: 48
End: 2023-06-21
Payer: COMMERCIAL

## 2023-06-21 VITALS
HEART RATE: 88 BPM | SYSTOLIC BLOOD PRESSURE: 110 MMHG | OXYGEN SATURATION: 99 % | BODY MASS INDEX: 31.62 KG/M2 | TEMPERATURE: 98 F | RESPIRATION RATE: 14 BRPM | WEIGHT: 185.2 LBS | DIASTOLIC BLOOD PRESSURE: 70 MMHG | HEIGHT: 64 IN

## 2023-06-21 DIAGNOSIS — J45.41 MODERATE PERSISTENT INTRINSIC ASTHMA WITH ACUTE EXACERBATION: ICD-10-CM

## 2023-06-21 DIAGNOSIS — J30.2 SEASONAL ALLERGIC RHINITIS, UNSPECIFIED TRIGGER: Primary | ICD-10-CM

## 2023-06-21 PROCEDURE — 1036F TOBACCO NON-USER: CPT | Performed by: PHYSICIAN ASSISTANT

## 2023-06-21 PROCEDURE — 99214 OFFICE O/P EST MOD 30 MIN: CPT | Performed by: PHYSICIAN ASSISTANT

## 2023-06-21 PROCEDURE — G8417 CALC BMI ABV UP PARAM F/U: HCPCS | Performed by: PHYSICIAN ASSISTANT

## 2023-06-21 PROCEDURE — G8428 CUR MEDS NOT DOCUMENT: HCPCS | Performed by: PHYSICIAN ASSISTANT

## 2023-06-21 RX ORDER — IBUPROFEN 600 MG/1
TABLET ORAL
COMMUNITY
Start: 2017-10-27

## 2023-06-21 RX ORDER — FLUTICASONE PROPIONATE AND SALMETEROL 100; 50 UG/1; UG/1
1 POWDER RESPIRATORY (INHALATION) EVERY 12 HOURS
Qty: 1 EACH | Refills: 5 | Status: SHIPPED | OUTPATIENT
Start: 2023-06-21 | End: 2023-06-21 | Stop reason: SDUPTHER

## 2023-06-21 RX ORDER — FOLIC ACID, CHOLECALCIFEROL, PYRIDOXINE HYDROCHLORIDE, CYANOCOBALAMIN, OMEGA-3 FATTY ACIDS, DOCONEXENT, ICOSAPENT, PHYTOSTEROLS 1; 1000; 12.5; 500; 500; 250; 35; 2 MG/1; [IU]/1; MG/1; UG/1; MG/1; MG/1; MG/1; MG/1
CAPSULE ORAL
COMMUNITY
Start: 2020-09-16

## 2023-06-21 RX ORDER — ERGOCALCIFEROL 1.25 MG/1
CAPSULE ORAL
COMMUNITY
Start: 2019-09-02

## 2023-06-21 RX ORDER — BACILLUS COAGULANS/INULIN 1B-250 MG
CAPSULE ORAL
COMMUNITY
Start: 2020-09-16

## 2023-06-21 SDOH — ECONOMIC STABILITY: FOOD INSECURITY: WITHIN THE PAST 12 MONTHS, YOU WORRIED THAT YOUR FOOD WOULD RUN OUT BEFORE YOU GOT MONEY TO BUY MORE.: NEVER TRUE

## 2023-06-21 SDOH — ECONOMIC STABILITY: FOOD INSECURITY: WITHIN THE PAST 12 MONTHS, THE FOOD YOU BOUGHT JUST DIDN'T LAST AND YOU DIDN'T HAVE MONEY TO GET MORE.: NEVER TRUE

## 2023-06-21 SDOH — ECONOMIC STABILITY: TRANSPORTATION INSECURITY
IN THE PAST 12 MONTHS, HAS LACK OF TRANSPORTATION KEPT YOU FROM MEETINGS, WORK, OR FROM GETTING THINGS NEEDED FOR DAILY LIVING?: NO

## 2023-06-21 SDOH — ECONOMIC STABILITY: HOUSING INSECURITY
IN THE LAST 12 MONTHS, WAS THERE A TIME WHEN YOU DID NOT HAVE A STEADY PLACE TO SLEEP OR SLEPT IN A SHELTER (INCLUDING NOW)?: NO

## 2023-06-21 SDOH — ECONOMIC STABILITY: INCOME INSECURITY: HOW HARD IS IT FOR YOU TO PAY FOR THE VERY BASICS LIKE FOOD, HOUSING, MEDICAL CARE, AND HEATING?: NOT VERY HARD

## 2023-06-21 ASSESSMENT — PATIENT HEALTH QUESTIONNAIRE - PHQ9
SUM OF ALL RESPONSES TO PHQ QUESTIONS 1-9: 0
SUM OF ALL RESPONSES TO PHQ QUESTIONS 1-9: 0
2. FEELING DOWN, DEPRESSED OR HOPELESS: 0
SUM OF ALL RESPONSES TO PHQ QUESTIONS 1-9: 0
SUM OF ALL RESPONSES TO PHQ9 QUESTIONS 1 & 2: 0
1. LITTLE INTEREST OR PLEASURE IN DOING THINGS: 0
SUM OF ALL RESPONSES TO PHQ QUESTIONS 1-9: 0

## 2023-06-21 ASSESSMENT — ENCOUNTER SYMPTOMS
EYE PAIN: 0
RHINORRHEA: 1
SINUS PRESSURE: 1
TROUBLE SWALLOWING: 0
COUGH: 1
SHORTNESS OF BREATH: 0
SINUS PAIN: 0
CHEST TIGHTNESS: 1

## 2023-06-21 NOTE — PROGRESS NOTES
Elan Ahmadi is a 57 y.o. female  Chief Complaint   Patient presents with    Cough     Started a week ago - chest congestion, cough with phlegm yellow, nose gets clogged, ear fullness in left ear      Vitals:    06/21/23 0832   BP: 110/70   Pulse: 88   Resp: 14   Temp: 98 °F (36.7 °C)   SpO2: 99%      Wt Readings from Last 3 Encounters:   06/21/23 185 lb 3.2 oz (84 kg)   06/02/23 165 lb (74.8 kg)   10/27/22 174 lb (78.9 kg)     BMI Readings from Last 3 Encounters:   06/21/23 31.79 kg/m²   06/02/23 28.32 kg/m²   10/27/22 29.87 kg/m²     Health Maintenance Due   Topic Date Due    Hepatitis C screen  Never done    Diabetes screen  Never done    Cervical cancer screen  12/09/2019    Colorectal Cancer Screen  Never done       HPI  Jam Madrigal MD's patient in to see me today for an acute visit. Hx Allergies, Asthma. Uses Wixela PRN asthma flares and current inhaler is very old. Sick x 7 days: Productive cough, nasal congestion. Mild SOB, ear pain, or high fever. Has Tried OTC cold meds and allergy meds with some improvement. ROS  Review of Systems   Constitutional:  Negative for fever. HENT:  Positive for congestion, postnasal drip, rhinorrhea and sinus pressure. Negative for ear pain, sinus pain and trouble swallowing. Eyes:  Negative for pain. Respiratory:  Positive for cough and chest tightness. Negative for shortness of breath. Cardiovascular:  Negative for chest pain. Skin:  Negative for rash. Allergic/Immunologic: Positive for environmental allergies. Neurological:  Positive for headaches. Negative for dizziness. Hematological:  Negative for adenopathy. EXAM  Physical Exam  Vitals and nursing note reviewed. Constitutional:       General: She is not in acute distress. Appearance: Normal appearance. She is not toxic-appearing. HENT:      Head: Normocephalic and atraumatic.       Right Ear: Ear canal and external ear normal.      Left Ear: Ear canal and external ear

## 2023-06-22 RX ORDER — FLUTICASONE PROPIONATE AND SALMETEROL 100; 50 UG/1; UG/1
1 POWDER RESPIRATORY (INHALATION) EVERY 12 HOURS
Qty: 1 EACH | Refills: 5 | Status: SHIPPED | OUTPATIENT
Start: 2023-06-22

## 2023-06-26 ENCOUNTER — TELEPHONE (OUTPATIENT)
Age: 48
End: 2023-06-26

## 2023-06-26 RX ORDER — AZITHROMYCIN 250 MG/1
250 TABLET, FILM COATED ORAL SEE ADMIN INSTRUCTIONS
Qty: 6 TABLET | Refills: 0 | Status: SHIPPED | OUTPATIENT
Start: 2023-06-26 | End: 2023-07-01

## 2024-02-02 ENCOUNTER — TELEPHONE (OUTPATIENT)
Age: 49
End: 2024-02-02

## 2024-02-02 NOTE — TELEPHONE ENCOUNTER
Called pt and lvm. Patient will like to eva [facility] care appt with Antionette in April.       \"Liudmila Mont[facility]  Staff  Subject: Appointment Request    Reason for Call: Established Patient Appointment needed: New Patient  Request Appointment    QUESTIONS    Reason for appointment request? No appointments available during search     Additional Information for Provider? Pt was a pt of Enma Blount, and  would like to get established with Antionette Ribeiro. Please call back to  schedule.  ---------------------------------------------------------------------------  --------------  CALL BACK INFO  [phone]; OK to leave message on voicemail  ---------------------------------------------------------------------------  --------------  SCRIPT ANSWERS\"

## 2024-04-08 ENCOUNTER — OFFICE VISIT (OUTPATIENT)
Age: 49
End: 2024-04-08
Payer: COMMERCIAL

## 2024-04-08 VITALS
SYSTOLIC BLOOD PRESSURE: 111 MMHG | BODY MASS INDEX: 33.77 KG/M2 | DIASTOLIC BLOOD PRESSURE: 70 MMHG | HEART RATE: 69 BPM | WEIGHT: 197.8 LBS | OXYGEN SATURATION: 100 % | TEMPERATURE: 97.8 F | HEIGHT: 64 IN | RESPIRATION RATE: 18 BRPM

## 2024-04-08 DIAGNOSIS — Z00.00 ANNUAL PHYSICAL EXAM: ICD-10-CM

## 2024-04-08 DIAGNOSIS — E66.9 OBESITY (BMI 30-39.9): Primary | ICD-10-CM

## 2024-04-08 DIAGNOSIS — J30.9 ALLERGIC RHINITIS, UNSPECIFIED SEASONALITY, UNSPECIFIED TRIGGER: ICD-10-CM

## 2024-04-08 PROCEDURE — 99214 OFFICE O/P EST MOD 30 MIN: CPT | Performed by: PHYSICIAN ASSISTANT

## 2024-04-08 PROCEDURE — G8417 CALC BMI ABV UP PARAM F/U: HCPCS | Performed by: PHYSICIAN ASSISTANT

## 2024-04-08 PROCEDURE — 1036F TOBACCO NON-USER: CPT | Performed by: PHYSICIAN ASSISTANT

## 2024-04-08 PROCEDURE — G8427 DOCREV CUR MEDS BY ELIG CLIN: HCPCS | Performed by: PHYSICIAN ASSISTANT

## 2024-04-08 RX ORDER — FEXOFENADINE HCL 180 MG/1
180 TABLET ORAL DAILY
COMMUNITY
Start: 2020-09-16

## 2024-04-08 ASSESSMENT — ENCOUNTER SYMPTOMS
COUGH: 0
SHORTNESS OF BREATH: 0

## 2024-04-08 ASSESSMENT — PATIENT HEALTH QUESTIONNAIRE - PHQ9
SUM OF ALL RESPONSES TO PHQ9 QUESTIONS 1 & 2: 0
SUM OF ALL RESPONSES TO PHQ QUESTIONS 1-9: 0
2. FEELING DOWN, DEPRESSED OR HOPELESS: NOT AT ALL
1. LITTLE INTEREST OR PLEASURE IN DOING THINGS: NOT AT ALL

## 2024-04-08 NOTE — PROGRESS NOTES
I have reviewed all needed documentation in preparation for visit. Verified patient by name and date of birth  Chief Complaint   Patient presents with    Establish Care     Not fasting- No concerns        Vitals:    04/08/24 1102   BP: 111/70   Site: Right Upper Arm   Position: Sitting   Cuff Size: Medium Adult   Pulse: 69   Resp: 18   Temp: 97.8 °F (36.6 °C)   TempSrc: Temporal   SpO2: 100%   Weight: 90.3 kg (199 lb)   Height: 1.626 m (5' 4\")       Health Maintenance Due   Topic Date Due    Hepatitis C screen  Never done    Diabetes screen  Never done    Cervical cancer screen  12/09/2019    COVID-19 Vaccine (5 - 2023-24 season) 09/01/2023     \"Have you been to the ER, urgent care clinic since your last visit?  Hospitalized since your last visit?\"    NO    “Have you seen or consulted any other health care providers outside of [facility] since your last visit?”    YES     “Have you had a pap smear?”    YES    Date of last Cervical Cancer screen (HPV or PAP): 12/9/2014             Click Here for Release of Records Request         Anu roy CMA  
is warm and dry.   Neurological:      General: No focal deficit present.      Mental Status: She is alert and oriented to person, place, and time.   Psychiatric:         Mood and Affect: Mood normal.         Behavior: Behavior normal.         Thought Content: Thought content normal.         Judgment: Judgment normal.       ASSESSMENT/PLAN    ICD-10-CM    1. Obesity (BMI 30-39.9)  E66.9       2. Allergic rhinitis, unspecified seasonality, unspecified trigger  J30.9       3. Annual physical exam  Z00.00 CBC     Comprehensive Metabolic Panel     Hemoglobin A1C     Lipid Panel     Thyroid Cascade Profile       Labs for CE ordered.   Discussed diet/exercise and options for/importance of losing weight.

## 2024-05-06 ENCOUNTER — PATIENT MESSAGE (OUTPATIENT)
Age: 49
End: 2024-05-06

## 2024-05-06 DIAGNOSIS — E55.9 VITAMIN D DEFICIENCY: ICD-10-CM

## 2024-05-06 DIAGNOSIS — Z00.00 ANNUAL PHYSICAL EXAM: ICD-10-CM

## 2024-05-06 DIAGNOSIS — Z00.00 ANNUAL PHYSICAL EXAM: Primary | ICD-10-CM

## 2024-05-06 NOTE — TELEPHONE ENCOUNTER
From: Denise Dumont  To: Antionette Ribeiro  Sent: 5/6/2024 10:04 AM EDT  Subject: Labs ordered    Hi Antionette,    Would it be feasible to add a vitamin D test to my next set of laboratory tests given my history of low vitamin D? Also, would it be feasible to please remove the Hemoglobin A1C laboratory test from my profile since I have not had a history of high fasting glucose results? Please not that my laboratory results from April and June 2023 were both associated with ER visits and were both drawn when I was non fasting.    Thank you for your time and consideration.     Thanks,   Denise Dumont, PhD MT([facility])

## 2024-06-20 LAB
ERYTHROCYTE [DISTWIDTH] IN BLOOD BY AUTOMATED COUNT: 12.1 % (ref 11.7–15.4)
HCT VFR BLD AUTO: 39 % (ref 34–46.6)
HGB BLD-MCNC: 12.1 G/DL (ref 11.1–15.9)
MCH RBC QN AUTO: 26.5 PG (ref 26.6–33)
MCHC RBC AUTO-ENTMCNC: 31 G/DL (ref 31.5–35.7)
MCV RBC AUTO: 86 FL (ref 79–97)
PLATELET # BLD AUTO: 230 X10E3/UL (ref 150–450)
RBC # BLD AUTO: 4.56 X10E6/UL (ref 3.77–5.28)
WBC # BLD AUTO: 5.1 X10E3/UL (ref 3.4–10.8)

## 2024-06-21 LAB
25(OH)D3+25(OH)D2 SERPL-MCNC: 69.1 NG/ML (ref 30–100)
ALBUMIN SERPL-MCNC: 4.5 G/DL (ref 3.9–4.9)
ALP SERPL-CCNC: 66 IU/L (ref 44–121)
ALT SERPL-CCNC: 14 IU/L (ref 0–32)
AST SERPL-CCNC: 17 IU/L (ref 0–40)
BILIRUB SERPL-MCNC: 0.4 MG/DL (ref 0–1.2)
BUN SERPL-MCNC: 11 MG/DL (ref 6–24)
BUN/CREAT SERPL: 11 (ref 9–23)
CALCIUM SERPL-MCNC: 9.4 MG/DL (ref 8.7–10.2)
CHLORIDE SERPL-SCNC: 104 MMOL/L (ref 96–106)
CHOLEST SERPL-MCNC: 131 MG/DL (ref 100–199)
CO2 SERPL-SCNC: 20 MMOL/L (ref 20–29)
CREAT SERPL-MCNC: 1.03 MG/DL (ref 0.57–1)
EGFRCR SERPLBLD CKD-EPI 2021: 67 ML/MIN/1.73
GLOBULIN SER CALC-MCNC: 2.7 G/DL (ref 1.5–4.5)
GLUCOSE SERPL-MCNC: 81 MG/DL (ref 70–99)
HDLC SERPL-MCNC: 41 MG/DL
IMP & REVIEW OF LAB RESULTS: NORMAL
LDLC SERPL CALC-MCNC: 76 MG/DL (ref 0–99)
POTASSIUM SERPL-SCNC: 4.4 MMOL/L (ref 3.5–5.2)
PROT SERPL-MCNC: 7.2 G/DL (ref 6–8.5)
SODIUM SERPL-SCNC: 137 MMOL/L (ref 134–144)
TRIGL SERPL-MCNC: 68 MG/DL (ref 0–149)
TSH SERPL DL<=0.005 MIU/L-ACNC: 1.57 UIU/ML (ref 0.45–4.5)
VLDLC SERPL CALC-MCNC: 14 MG/DL (ref 5–40)